# Patient Record
Sex: FEMALE | Race: WHITE | Employment: UNEMPLOYED | URBAN - METROPOLITAN AREA
[De-identification: names, ages, dates, MRNs, and addresses within clinical notes are randomized per-mention and may not be internally consistent; named-entity substitution may affect disease eponyms.]

---

## 2021-07-14 ENCOUNTER — TELEPHONE (OUTPATIENT)
Dept: SURGERY | Facility: CLINIC | Age: 56
End: 2021-07-14

## 2021-07-14 DIAGNOSIS — Z12.11 COLON CANCER SCREENING: Primary | ICD-10-CM

## 2021-07-27 LAB
B PARAP IS1001 DNA: NOT DETECTED
B PERT.PT PROM REG: NOT DETECTED
C PNEUM DNA: NOT DETECTED
CALCIUM BLD-MCNC: POSITIVE MG/DL
COVID-19 AB, IGM: POSITIVE
FLUAV RNA: NOT DETECTED
FLUBV RNA: NOT DETECTED
HADV DNA: NOT DETECTED
HCOV 229E RNA: NOT DETECTED
HCOV HKU1 RNA: NOT DETECTED
HCOV NL63 RNA: NOT DETECTED
HCOV OC43 RNA: NOT DETECTED
HMPV RNA: NOT DETECTED
HPIV1 RNA: NOT DETECTED
HPIV2 RNA: NOT DETECTED
HPIV3 RNA: NOT DETECTED
HPIV4 RNA: NOT DETECTED
M PNEUMO DNA: NOT DETECTED
RSV RNA: NOT DETECTED
RV+EV RNA: NOT DETECTED
SARS-COV-2 RNA RESP QL NAA+PROBE: NOT DETECTED

## 2021-07-30 ENCOUNTER — OUTSIDE PLACE OF SERVICE (OUTPATIENT)
Dept: SURGERY | Facility: CLINIC | Age: 56
End: 2021-07-30
Payer: MEDICAID

## 2021-07-30 PROCEDURE — G0121 COLON CA SCRN NOT HI RSK IND: ICD-10-PCS | Mod: ,,, | Performed by: SURGERY

## 2021-07-30 PROCEDURE — G0121 COLON CA SCRN NOT HI RSK IND: HCPCS | Mod: ,,, | Performed by: SURGERY

## 2023-08-08 DIAGNOSIS — R51.9 FREQUENT HEADACHES: Primary | ICD-10-CM

## 2023-12-07 ENCOUNTER — OFFICE VISIT (OUTPATIENT)
Dept: NEUROLOGY | Facility: CLINIC | Age: 58
End: 2023-12-07
Payer: MEDICAID

## 2023-12-07 ENCOUNTER — DOCUMENTATION ONLY (OUTPATIENT)
Dept: NEUROLOGY | Facility: CLINIC | Age: 58
End: 2023-12-07

## 2023-12-07 VITALS
RESPIRATION RATE: 18 BRPM | SYSTOLIC BLOOD PRESSURE: 107 MMHG | WEIGHT: 188.63 LBS | OXYGEN SATURATION: 99 % | BODY MASS INDEX: 33.42 KG/M2 | HEIGHT: 63 IN | TEMPERATURE: 98 F | HEART RATE: 57 BPM | DIASTOLIC BLOOD PRESSURE: 72 MMHG

## 2023-12-07 DIAGNOSIS — R56.9 SEIZURES: ICD-10-CM

## 2023-12-07 DIAGNOSIS — G43.109 OCULAR MIGRAINE: ICD-10-CM

## 2023-12-07 DIAGNOSIS — G43.E11 CHRONIC MIGRAINE WITH AURA, INTRACTABLE, WITH STATUS MIGRAINOSUS: Primary | ICD-10-CM

## 2023-12-07 DIAGNOSIS — R51.9 FREQUENT HEADACHES: ICD-10-CM

## 2023-12-07 PROCEDURE — 3008F BODY MASS INDEX DOCD: CPT | Mod: CPTII,,, | Performed by: NURSE PRACTITIONER

## 2023-12-07 PROCEDURE — 96372 THER/PROPH/DIAG INJ SC/IM: CPT | Mod: PBBFAC

## 2023-12-07 PROCEDURE — 3074F SYST BP LT 130 MM HG: CPT | Mod: CPTII,,, | Performed by: NURSE PRACTITIONER

## 2023-12-07 PROCEDURE — 3008F PR BODY MASS INDEX (BMI) DOCUMENTED: ICD-10-PCS | Mod: CPTII,,, | Performed by: NURSE PRACTITIONER

## 2023-12-07 PROCEDURE — 3078F PR MOST RECENT DIASTOLIC BLOOD PRESSURE < 80 MM HG: ICD-10-PCS | Mod: CPTII,,, | Performed by: NURSE PRACTITIONER

## 2023-12-07 PROCEDURE — 1160F RVW MEDS BY RX/DR IN RCRD: CPT | Mod: CPTII,,, | Performed by: NURSE PRACTITIONER

## 2023-12-07 PROCEDURE — 99215 OFFICE O/P EST HI 40 MIN: CPT | Mod: PBBFAC | Performed by: NURSE PRACTITIONER

## 2023-12-07 PROCEDURE — 1159F MED LIST DOCD IN RCRD: CPT | Mod: CPTII,,, | Performed by: NURSE PRACTITIONER

## 2023-12-07 PROCEDURE — 3078F DIAST BP <80 MM HG: CPT | Mod: CPTII,,, | Performed by: NURSE PRACTITIONER

## 2023-12-07 PROCEDURE — 99205 PR OFFICE/OUTPT VISIT, NEW, LEVL V, 60-74 MIN: ICD-10-PCS | Mod: FQ,S$PBB,, | Performed by: NURSE PRACTITIONER

## 2023-12-07 PROCEDURE — 1159F PR MEDICATION LIST DOCUMENTED IN MEDICAL RECORD: ICD-10-PCS | Mod: CPTII,,, | Performed by: NURSE PRACTITIONER

## 2023-12-07 PROCEDURE — 3074F PR MOST RECENT SYSTOLIC BLOOD PRESSURE < 130 MM HG: ICD-10-PCS | Mod: CPTII,,, | Performed by: NURSE PRACTITIONER

## 2023-12-07 PROCEDURE — 99205 OFFICE O/P NEW HI 60 MIN: CPT | Mod: FQ,S$PBB,, | Performed by: NURSE PRACTITIONER

## 2023-12-07 PROCEDURE — 1160F PR REVIEW ALL MEDS BY PRESCRIBER/CLIN PHARMACIST DOCUMENTED: ICD-10-PCS | Mod: CPTII,,, | Performed by: NURSE PRACTITIONER

## 2023-12-07 RX ORDER — TOPIRAMATE 50 MG/1
50 TABLET, FILM COATED ORAL 2 TIMES DAILY
COMMUNITY

## 2023-12-07 RX ORDER — IBUPROFEN 800 MG/1
800 TABLET ORAL 2 TIMES DAILY PRN
COMMUNITY

## 2023-12-07 RX ORDER — SUMATRIPTAN 5 MG/1
5 SPRAY NASAL
COMMUNITY
End: 2023-12-07

## 2023-12-07 RX ORDER — KETOROLAC TROMETHAMINE 30 MG/ML
30 INJECTION, SOLUTION INTRAMUSCULAR; INTRAVENOUS
Status: COMPLETED | OUTPATIENT
Start: 2023-12-07 | End: 2023-12-07

## 2023-12-07 RX ORDER — ALBUTEROL SULFATE 90 UG/1
1-2 AEROSOL, METERED RESPIRATORY (INHALATION)
COMMUNITY
Start: 2023-11-21

## 2023-12-07 RX ORDER — LORAZEPAM 1 MG/1
1 TABLET ORAL EVERY 8 HOURS PRN
COMMUNITY

## 2023-12-07 RX ORDER — PROMETHAZINE HYDROCHLORIDE 50 MG/1
50 TABLET ORAL EVERY 6 HOURS PRN
COMMUNITY

## 2023-12-07 RX ORDER — HYDROCHLOROTHIAZIDE 50 MG/1
50 TABLET ORAL DAILY
COMMUNITY

## 2023-12-07 RX ORDER — SEMAGLUTIDE 0.68 MG/ML
0.5 INJECTION, SOLUTION SUBCUTANEOUS
COMMUNITY
Start: 2023-11-29

## 2023-12-07 RX ORDER — OFLOXACIN 3 MG/ML
5 SOLUTION AURICULAR (OTIC)
COMMUNITY
Start: 2023-09-11

## 2023-12-07 RX ORDER — RIMEGEPANT SULFATE 75 MG/75MG
75 TABLET, ORALLY DISINTEGRATING ORAL ONCE AS NEEDED
COMMUNITY
End: 2023-12-07

## 2023-12-07 RX ORDER — FLUTICASONE PROPIONATE 50 MCG
2 SPRAY, SUSPENSION (ML) NASAL
COMMUNITY
Start: 2023-08-20

## 2023-12-07 RX ORDER — UBROGEPANT 100 MG/1
100 TABLET ORAL 2 TIMES DAILY PRN
Qty: 16 TABLET | Refills: 2 | Status: SHIPPED | OUTPATIENT
Start: 2023-12-07 | End: 2024-03-12 | Stop reason: SDUPTHER

## 2023-12-07 RX ORDER — LEVETIRACETAM 750 MG/1
750 TABLET, EXTENDED RELEASE ORAL DAILY
COMMUNITY

## 2023-12-07 RX ORDER — LINACLOTIDE 72 UG/1
72 CAPSULE, GELATIN COATED ORAL
COMMUNITY

## 2023-12-07 RX ORDER — METOPROLOL TARTRATE 50 MG/1
50 TABLET ORAL 2 TIMES DAILY
COMMUNITY

## 2023-12-07 RX ORDER — LEVOCETIRIZINE DIHYDROCHLORIDE 5 MG/1
5 TABLET ORAL NIGHTLY
COMMUNITY

## 2023-12-07 RX ORDER — ALBUTEROL SULFATE 0.83 MG/ML
2.5 SOLUTION RESPIRATORY (INHALATION)
COMMUNITY
Start: 2023-11-20

## 2023-12-07 RX ORDER — ESCITALOPRAM OXALATE 10 MG/1
10 TABLET ORAL EVERY MORNING
COMMUNITY
End: 2024-03-12

## 2023-12-07 RX ORDER — POLYETHYLENE GLYCOL 3350 17 G/17G
17 POWDER, FOR SOLUTION ORAL DAILY
COMMUNITY
Start: 2023-11-28 | End: 2024-03-12

## 2023-12-07 RX ORDER — DICLOFENAC POTASSIUM 50 MG/1
50 TABLET, FILM COATED ORAL 2 TIMES DAILY PRN
COMMUNITY
Start: 2023-11-16

## 2023-12-07 RX ORDER — RIZATRIPTAN BENZOATE 10 MG/1
10 TABLET ORAL ONCE AS NEEDED
COMMUNITY
Start: 2023-03-27 | End: 2023-12-07

## 2023-12-07 RX ADMIN — KETOROLAC TROMETHAMINE 30 MG: 30 INJECTION, SOLUTION INTRAMUSCULAR; INTRAVENOUS at 02:12

## 2023-12-07 NOTE — PROGRESS NOTES
Lorrie Troncoso NP ordered for patient to complete EEG. [employer] order form completed and faxed along with clinicals/facesheet. Fax confirmation successful.

## 2023-12-07 NOTE — PROGRESS NOTES
[facility] Neurology Initial Office Visit Note    Initial Visit Date: 12/7/2023  Last Visit Date:  Current Visit Date:  12/07/2023    Chief Complaint:     Chief Complaint   Patient presents with    Headache    Seizures     C/o headache today pain 4/10, stays with headaches 18-19 days out of the month, started at 5-6 years old, previous specialist was in [place]; h/o seizures, last one was a few months ago     History of Present Illness:      This is 57 y.o. female with history of COVID 6/2022, HTN, seizure, migraines, cholesteatoma, chronic ear infection, tympanomastoidectomy, HANNA on CPAP, R CWU mastoidectomy who is referred headache disorder. Currently c/o migraine accompanied by dizziness and OS ptosis.    Age of Onset : 5-7 YO    Headache Description: L frontal and temporal area, cannot describe type, accompanied by nausea/vomiting/photophobia/phonophobia, worsened w/activity, must lay in bed may last up to 3 days if does not take any medication, severe in nature, also accompanied by sparkles and zig zag to only one eye    Frequency: 18-19 headache days per month.     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  - History head trauma: Yes fell  - History of underlying mood disorder: Yes anxiety/depression  - History of sleep disorder: Yes HANNA on CPAP  - Recreational drug use: No  - Tobacco use: No  - Alcohol use: No  - Weight fluctuation: No  - Isotretinoin or Tetracycline use:  Unknown  - Family planning and contraceptive use: Not Applicable    # Seizure  Age of Onset : 5 yrs ago after fell and hit head    Semiology: R arms moves up and down, head tilts backward, body stiffens, a moan or utterance, lasts 2 minutes, jerking stops, sleeps afterward, lost bladder once    Frequency: 48 events/month prior to treatment, GTC frequency 1 events/4-6  months    Provocation Factors: stress    Risk Factors  - Family history of seizure disorders:  Yes niece  - History of focal CNS lesions: No  - History of CNS infections: Yes encephalitis  - History head trauma with prolonged LOC: yes after 1st seizures  - History of childhood seizures, including febrile seizures: No  - History of development delay: No   - History of underlying mood disorder: Yes depression/anxiety   - History of sleep disorder: Yes HANNA on CPAP  - Recreational drug use: No  - Alcohol use: No  - Family planning and contraceptive use: Not Applicable   Medications:     Current Prophylactic  TPM  50 mg PO BID - ineffective  Metoprolol 50mg daily - ineffective for migraine    Current Abortive  Nurtec ODT - partially effective  Sumatriptan nasal spray - somewhat effective    Prior Prophylactic  Aimovig - ineffective  Botox - 2021 - ineffective  Emgality - nausea  Depakote XR 500mg nightly (3-2022 - 10/2022) - ineffective    Prior Abortive  Rizatriptan 10mg O BID - ineffective      Medications:     Current Anti-Seizure Medication(s):  Keppra XR 1500mg daily    Prior Anti-Seizure Medication(s):  Depakote XR 500mg nighlty    Surgical Intervention & Devices:     - VNS:  - DBS  - RNS:  - Lobectomy:    Studies:    MRI IAC/Temporal Bones 12/19/2022 - persistent T2 hyperintensity in the R mastoidectomy cavity which does have central hypointensity  - MRI Brain: 10/11/2017 - minimal asymmetry in the size of the temporal horns of the lateral ventricles. Otherwise, no acute intracranial findings.  - MRA Head w/o Nathan:   - MRV Head w/o Nathan:   - NCHCT:  - Lumbar Puncture:    - routine EEG:   - one hour EEG:   - 24 hour EEG:  - Ambulatory EEG:  - EMU Video EEG:  - MRI Brain:   - NCHCT:  - WADA:    Review of Systems:     ROS  As per HPI. All other systems negative  Physical Exams:     Vitals:    12/07/23 1317   BP: 107/72   Pulse: (!) 57   Resp: 18   Temp: 97.5 °F (36.4 °C)       Physical Exam  HENT:      Head:  Normocephalic.      Right Ear: External ear normal.      Left Ear: External ear normal.      Nose: Nose normal.      Mouth/Throat:      Mouth: Mucous membranes are moist.   Eyes:      Pupils: Pupils are equal, round, and reactive to light.   Cardiovascular:      Rate and Rhythm: Bradycardia present.   Pulmonary:      Effort: Pulmonary effort is normal.   Abdominal:      General: There is no distension.      Tenderness: There is no guarding.      Comments: round   Musculoskeletal:         General: Normal range of motion.      Cervical back: Normal range of motion.   Skin:     General: Skin is warm and dry.   Neurological:      General: No focal deficit present.      Mental Status: She is alert.   Psychiatric:         Mood and Affect: Mood normal.     Comprehensive Neurological Exam:  Mental Status: Alert Oriented to Self, Date, and Place. Naming, repetition, reading, and writing wnl. Comprehension wnl. No dysarthria.   CN II - XII: BAY, No APD, VA grossly intact to finger counting at 6 ft, VFFC, OS ptosis, EOMI without nystagmus, LT/Temp symmetric in CN V1-3 distribution, Hearing grossly intact, Face Symmetric, Tongue and Uvula midline, Trapezius symmetric bilateral.   Motor: tone and bulk wnl throughout, no abnormal involuntary or voluntary movements, 5/5 to confrontation, Fine finger movements wnl b/l, No pronator drift.   Sensory: LT, Proprioception, Vibration, PP, Temp symmetric. No sensory simultagnosia.   Reflexes: 2+ throughout, plantar reflexes downward bilateral.   Cerebellar: FNF wnl b/l, RAHM wnl b/l, Finger chasing 5/5 b/l, HTS wnl.  Romberg: Negative  Modified Tandem Romberg: wnl  Gait: normal. Heel Gait, Toe Gait, Tandem Gait wnl.     Assessment:     This is 57 y.o. female with history of COVID 6/2022, HTN, seizure, migraines, cholesteatoma, chronic ear infection, R CWU mastoidectomy, who is referred headache disorder. HA are migraine w/aura, intractable and ocular in nature. Pt informed me that she  suffers with pseudoseizures. Has tried and failed multiple medications for migraine prevention and abortive therapy. Qulipta is to be used as prophylaxic medication, Ubrelvy is to be used as abortive therapy.    Problem List Items Addressed This Visit          Neuro    Ocular migraine       Other    Chronic migraine with aura, intractable, with status migrainosus - Primary    Relevant Medications    ubrogepant (UBRELVY) 100 mg tablet    atogepant 60 mg Tab     Other Visit Diagnoses       Frequent headaches        Seizures              Plan:     [] c/w phenergan 50mg PO BID PRN nausea  [] DC all triptans as they are contraindicated in ocular migraine  [] start Ubrelvy 100mg PO BID PRN at onset of migraine  [] Start Qulipta 60mg daily as migraine preventative has failed two injectables and two other preventative medications  [] call office for migraine >24 hrs and failed abortive therapy; will call in HA gustavo  [] home rEEG    RTC 3 mths    Headache education provided: good sleep hygiene and 7 hours of sleep per night, stress management, medication overuse education provided. Using more 3 OTC per week may worsen headaches, high intensity interval training has shown to reduce headache frequency. Low carb, high protein has shown to reduce headache frequency. Patient is instructed in keep headache diary.     I have explained the treatment plan, diagnosis, and prognosis to patient. All questions are answered to the best of my knowledge.     Face to face time 60 minutes, including documentation, chart review, counseling, education, review of test results, relevant medical records, and coordination of care.     Lorrie FLOWERS  General Neurology  12/07/2023

## 2023-12-11 ENCOUNTER — TELEPHONE (OUTPATIENT)
Dept: NEUROLOGY | Facility: CLINIC | Age: 58
End: 2023-12-11
Payer: MEDICAID

## 2023-12-11 NOTE — TELEPHONE ENCOUNTER
Notified Ms Andrew-both Ubrelvy and Atogepant (Qulipita) were approved. Ms Bennett verbalized understanding. Also notified Violetta with [facility] pharmacy.        ----- Message from Silvia Madison sent at 12/11/2023 10:40 AM CST -----  Regarding: Medication Management  Pt stated that Pharmacy is needing a PA on RX ATOGRPANT 60 mg and UBREVLY 100 mg. Pt can be reached at  [phone]    Thank You

## 2024-03-12 ENCOUNTER — OFFICE VISIT (OUTPATIENT)
Dept: NEUROLOGY | Facility: CLINIC | Age: 59
End: 2024-03-12
Payer: MEDICAID

## 2024-03-12 VITALS
BODY MASS INDEX: 32.43 KG/M2 | HEART RATE: 59 BPM | SYSTOLIC BLOOD PRESSURE: 120 MMHG | OXYGEN SATURATION: 98 % | WEIGHT: 183 LBS | HEIGHT: 63 IN | DIASTOLIC BLOOD PRESSURE: 84 MMHG

## 2024-03-12 DIAGNOSIS — G43.109 OCULAR MIGRAINE: Primary | ICD-10-CM

## 2024-03-12 DIAGNOSIS — G43.E11 CHRONIC MIGRAINE WITH AURA, INTRACTABLE, WITH STATUS MIGRAINOSUS: ICD-10-CM

## 2024-03-12 PROCEDURE — 1160F RVW MEDS BY RX/DR IN RCRD: CPT | Mod: CPTII,,, | Performed by: NURSE PRACTITIONER

## 2024-03-12 PROCEDURE — 3008F BODY MASS INDEX DOCD: CPT | Mod: CPTII,,, | Performed by: NURSE PRACTITIONER

## 2024-03-12 PROCEDURE — 99214 OFFICE O/P EST MOD 30 MIN: CPT | Mod: S$PBB,,, | Performed by: NURSE PRACTITIONER

## 2024-03-12 PROCEDURE — 99215 OFFICE O/P EST HI 40 MIN: CPT | Mod: PBBFAC | Performed by: NURSE PRACTITIONER

## 2024-03-12 PROCEDURE — 3074F SYST BP LT 130 MM HG: CPT | Mod: CPTII,,, | Performed by: NURSE PRACTITIONER

## 2024-03-12 PROCEDURE — 3079F DIAST BP 80-89 MM HG: CPT | Mod: CPTII,,, | Performed by: NURSE PRACTITIONER

## 2024-03-12 PROCEDURE — G2211 COMPLEX E/M VISIT ADD ON: HCPCS | Mod: S$PBB,,, | Performed by: NURSE PRACTITIONER

## 2024-03-12 PROCEDURE — 1159F MED LIST DOCD IN RCRD: CPT | Mod: CPTII,,, | Performed by: NURSE PRACTITIONER

## 2024-03-12 RX ORDER — LAMOTRIGINE 25 MG/1
25 TABLET ORAL 2 TIMES DAILY
Qty: 60 TABLET | Refills: 11 | Status: SHIPPED | OUTPATIENT
Start: 2024-03-12 | End: 2024-06-19 | Stop reason: SDUPTHER

## 2024-03-12 RX ORDER — SEMAGLUTIDE 1.34 MG/ML
1 INJECTION, SOLUTION SUBCUTANEOUS
COMMUNITY
Start: 2024-02-19 | End: 2024-06-19

## 2024-03-12 RX ORDER — PANTOPRAZOLE SODIUM 40 MG/1
40 TABLET, DELAYED RELEASE ORAL DAILY
COMMUNITY
Start: 2024-02-16 | End: 2024-06-19

## 2024-03-12 RX ORDER — UBROGEPANT 100 MG/1
100 TABLET ORAL 2 TIMES DAILY PRN
Qty: 16 TABLET | Refills: 2 | Status: SHIPPED | OUTPATIENT
Start: 2024-03-12 | End: 2024-06-19 | Stop reason: SDUPTHER

## 2024-03-12 NOTE — PROGRESS NOTES
[facility] Neurology Initial Office Visit Note    Initial Visit Date: 12/7/2023  Last Visit Date: 12/7/2023  Current Visit Date:  03/12/2024    Chief Complaint:     Chief Complaint   Patient presents with    Migraine     Patient reports migraines are the same.    Seizures     Patient's  reports that patient had a seizure approx. 2 weeks ago.     History of Present Illness:      This is 58 y.o. female with history of COVID 6/2022, HTN, seizure, migraines, cholesteatoma, chronic ear infection, tympanomastoidectomy, HANNA on CPAP, R CWU mastoidectomy who was referred headache disorder. Currently c/o migraine accompanied by dizziness and OS ptosis. During last visit, Qulipta 60mg daily and Ubrelvy 100mg PO BID were initiated. Triptan discontinued.    Today, Pt states she continues w/daily migraine. Does not find improvement on Qulipta. Ubrelvy effective as abortive therapy, but requires 2 doses and runs out before end of month. C/O pain to L temporal area radiating distally in front of tragus. Denies  jaw pain. Has tried and failed multiple medications in past.     Age of Onset : 5-7 YO    Headache Description: L frontal and temporal area, cannot describe type, accompanied by nausea/vomiting/photophobia/phonophobia, worsened w/activity, must lay in bed may last up to 3 days if does not take any medication, severe in nature, also accompanied by sparkles and zig zag to only one eye    Frequency: 18-19 headache days per month.     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  - History head trauma: Yes fell  - History of underlying mood disorder: Yes anxiety/depression  - History of sleep disorder: Yes HANNA on CPAP  - Recreational drug use: No  - Tobacco use: No  - Alcohol use: No  - Weight fluctuation: No  - Isotretinoin or Tetracycline use:  Unknown  -  Family planning and contraceptive use: Not Applicable    # Seizure  Age of Onset : 5 yrs ago after fell and hit head    Semiology: R arms moves up and down, head tilts backward, body stiffens, a moan or utterance, lasts 2 minutes, jerking stops, sleeps afterward, lost bladder once    Frequency: 48 events/month prior to treatment, GTC frequency 1 events/4-6 months    Provocation Factors: stress    Risk Factors  - Family history of seizure disorders:  Yes niece  - History of focal CNS lesions: No  - History of CNS infections: Yes encephalitis  - History head trauma with prolonged LOC: yes after 1st seizures  - History of childhood seizures, including febrile seizures: No  - History of development delay: No   - History of underlying mood disorder: Yes depression/anxiety   - History of sleep disorder: Yes HANNA on CPAP  - Recreational drug use: No  - Alcohol use: No  - Family planning and contraceptive use: Not Applicable   Medications:     Current Prophylactic  Qulipta 60mg PO Qday (12/7/2023 - present) ineffective  TPM  50 mg PO BID - ineffective  Metoprolol 50mg daily - ineffective for migraine    Current Abortive  Ubrelvy 100mg PO BID PRN (12/7/2023 - present) - effective    Prior Prophylactic  Aimovig - ineffective  Botox - 2021 - ineffective  Emgality - nausea  Depakote XR 500mg nightly (3-2022 - 10/2022) - ineffective    Prior Abortive  Rizatriptan 10mg O BID - ineffective  Nurtec ODT - partially effective  Sumatriptan nasal spray - somewhat effective  Medications:     Current Anti-Seizure Medication(s):  Keppra XR 1500mg daily    Prior Anti-Seizure Medication(s):  Depakote XR 500mg nighlty    Surgical Intervention & Devices:     - VNS:  - DBS  - RNS:  - Lobectomy:    Studies:    - MRI IAC/Temporal Bones 12/19/2022 - persistent T2 hyperintensity in the R mastoidectomy cavity which does have central hypointensity  - MRI Brain: 10/11/2017 - minimal asymmetry in the size of the temporal horns of the lateral  ventricles. Otherwise, no acute intracranial findings.  - MRA Head w/o Nathan:   - MRV Head w/o Nathan:   - NCHCT:  - Lumbar Puncture:    - routine EEG 12.21.2023 - no epileptiform activity  - one hour EEG:   - 24 hour EEG:  - Ambulatory EEG:  - EMU Video EEG:  - MRI Brain:   - NCHCT:  - WADA:    Review of Systems:     ROS  As per HPI. All other systems negative  Physical Exams:     Vitals:    03/12/24 1248   BP: 120/84   Pulse: (!) 59         Physical Exam  HENT:      Head: Normocephalic.      Comments: Pain to palpation to L temporal area, L upper jaw and L tragus, NO pain to TMJ     Right Ear: External ear normal.      Left Ear: External ear normal.      Nose: Nose normal.      Mouth/Throat:      Mouth: Mucous membranes are moist.   Eyes:      Pupils: Pupils are equal, round, and reactive to light.   Cardiovascular:      Rate and Rhythm: Bradycardia present.   Pulmonary:      Effort: Pulmonary effort is normal.   Abdominal:      General: There is no distension.      Tenderness: There is no guarding.      Comments: round   Musculoskeletal:         General: Normal range of motion.      Cervical back: Normal range of motion.   Skin:     General: Skin is warm and dry.   Neurological:      General: No focal deficit present.      Mental Status: She is alert.   Psychiatric:         Mood and Affect: Mood normal.     Comprehensive Neurological Exam:  Mental Status: Alert Oriented to Self, Date, and Place. Naming, repetition, reading, and writing wnl. Comprehension wnl. No dysarthria.   CN II - XII: BAY, No APD, VA grossly intact to finger counting at 6 ft, VFFC, OS ptosis, EOMI without nystagmus, LT/Temp symmetric in CN V1-3 distribution, Hearing grossly intact, Face Symmetric, Tongue and Uvula midline, Trapezius symmetric bilateral.   Motor: tone and bulk wnl throughout, no abnormal involuntary or voluntary movements, 5/5 to confrontation, Fine finger movements wnl b/l, No pronator drift.   Sensory: LT, Proprioception,  Vibration, PP, Temp symmetric. No sensory simultagnosia.   Reflexes: 2+ throughout, plantar reflexes downward bilateral.   Cerebellar: FNF wnl b/l, RAHM wnl b/l, Finger chasing 5/5 b/l, HTS wnl.  Romberg: Negative  Modified Tandem Romberg: wnl  Gait: normal, bilat arm swing intact    Assessment:     This is 58 y.o. female with history of COVID 6/2022, HTN, seizure, migraines, cholesteatoma, chronic ear infection, R CWU mastoidectomy, who is referred headache disorder. HA are migraine w/aura, intractable and ocular in nature. Pt informed me that she suffers with pseudoseizures. Has tried and failed multiple medications for migraine prevention and abortive therapy. Tried and failed Qulipta and two injectable medications. Ubrelvy effective as abortive therapy, but requires two doses and runs out early. Migraines are affecting quality of life as it interferes with day to day activity. C/O L temporal pain that radiates to L tragus. Denies jaw pain.    Problem List Items Addressed This Visit          Neuro    Ocular migraine - Primary       Other    Chronic migraine with aura, intractable, with status migrainosus    Relevant Medications    lamoTRIgine (LAMICTAL) 25 MG tablet    ubrogepant (UBRELVY) 100 mg tablet    Other Relevant Orders    Sedimentation rate    C-Reactive Protein       Plan:     [] c/w phenergan 50mg PO BID PRN nausea  [] DC all triptans as they are contraindicated in ocular migraine  [] c/w Ubrelvy 100mg PO BID PRN at onset of migraine  [] d/c Qulipta 60mg daily as migraine preventative has failed two injectables and two other preventative medications  [] start LTG 25mg PO Qday x 1 week then increase to BID  [] call office for migraine >24 hrs and failed abortive therapy; will call in HA cocktail  [] Nerivio device for migraine  [] ESR, CRP to r/o temporal arteritis     RTC 3 mths    Headache education provided: good sleep hygiene and 7 hours of sleep per night, stress management, medication overuse  education provided. Using more 3 OTC per week may worsen headaches, high intensity interval training has shown to reduce headache frequency. Low carb, high protein has shown to reduce headache frequency. Patient is instructed in keep headache diary.     I have explained the treatment plan, diagnosis, and prognosis to patient. All questions are answered to the best of my knowledge.     Face to face time 30 minutes, including documentation, chart review, counseling, education, review of test results, relevant medical records, and coordination of care.     Lorrie Troncoso NP-C  General Neurology  03/12/2024

## 2024-03-13 ENCOUNTER — PATIENT MESSAGE (OUTPATIENT)
Dept: NEUROLOGY | Facility: CLINIC | Age: 59
End: 2024-03-13
Payer: MEDICAID

## 2024-04-11 ENCOUNTER — PATIENT MESSAGE (OUTPATIENT)
Dept: NEUROLOGY | Facility: CLINIC | Age: 59
End: 2024-04-11
Payer: MEDICAID

## 2024-06-19 ENCOUNTER — OFFICE VISIT (OUTPATIENT)
Dept: NEUROLOGY | Facility: CLINIC | Age: 59
End: 2024-06-19
Payer: MEDICAID

## 2024-06-19 VITALS
DIASTOLIC BLOOD PRESSURE: 73 MMHG | HEIGHT: 63 IN | BODY MASS INDEX: 32.43 KG/M2 | WEIGHT: 183 LBS | SYSTOLIC BLOOD PRESSURE: 104 MMHG | OXYGEN SATURATION: 97 % | HEART RATE: 65 BPM

## 2024-06-19 DIAGNOSIS — G43.109 OCULAR MIGRAINE: Primary | ICD-10-CM

## 2024-06-19 DIAGNOSIS — E66.1 CLASS 1 DRUG-INDUCED OBESITY WITHOUT SERIOUS COMORBIDITY WITH BODY MASS INDEX (BMI) OF 32.0 TO 32.9 IN ADULT: ICD-10-CM

## 2024-06-19 DIAGNOSIS — G43.E11 CHRONIC MIGRAINE WITH AURA, INTRACTABLE, WITH STATUS MIGRAINOSUS: ICD-10-CM

## 2024-06-19 PROBLEM — E66.811 CLASS 1 DRUG-INDUCED OBESITY WITHOUT SERIOUS COMORBIDITY WITH BODY MASS INDEX (BMI) OF 32.0 TO 32.9 IN ADULT: Status: ACTIVE | Noted: 2024-06-19

## 2024-06-19 PROCEDURE — 3008F BODY MASS INDEX DOCD: CPT | Mod: CPTII,,, | Performed by: NURSE PRACTITIONER

## 2024-06-19 PROCEDURE — 3078F DIAST BP <80 MM HG: CPT | Mod: CPTII,,, | Performed by: NURSE PRACTITIONER

## 2024-06-19 PROCEDURE — 1159F MED LIST DOCD IN RCRD: CPT | Mod: CPTII,,, | Performed by: NURSE PRACTITIONER

## 2024-06-19 PROCEDURE — 1160F RVW MEDS BY RX/DR IN RCRD: CPT | Mod: CPTII,,, | Performed by: NURSE PRACTITIONER

## 2024-06-19 PROCEDURE — 99214 OFFICE O/P EST MOD 30 MIN: CPT | Mod: PBBFAC | Performed by: NURSE PRACTITIONER

## 2024-06-19 PROCEDURE — 3074F SYST BP LT 130 MM HG: CPT | Mod: CPTII,,, | Performed by: NURSE PRACTITIONER

## 2024-06-19 PROCEDURE — 99214 OFFICE O/P EST MOD 30 MIN: CPT | Mod: S$PBB,,, | Performed by: NURSE PRACTITIONER

## 2024-06-19 RX ORDER — HYDROXYZINE PAMOATE 50 MG/1
50 CAPSULE ORAL
COMMUNITY
Start: 2024-05-29

## 2024-06-19 RX ORDER — UBROGEPANT 100 MG/1
100 TABLET ORAL 2 TIMES DAILY PRN
Qty: 16 TABLET | Refills: 2 | Status: SHIPPED | OUTPATIENT
Start: 2024-06-19

## 2024-06-19 RX ORDER — LEVETIRACETAM 750 MG/1
1500 TABLET, EXTENDED RELEASE ORAL DAILY
Qty: 120 TABLET | Refills: 11
Start: 2024-06-19

## 2024-06-19 RX ORDER — TIRZEPATIDE 2.5 MG/.5ML
2.5 INJECTION, SOLUTION SUBCUTANEOUS
COMMUNITY
Start: 2024-06-02

## 2024-06-19 RX ORDER — CETIRIZINE HYDROCHLORIDE 10 MG/1
10 TABLET ORAL EVERY MORNING
COMMUNITY
Start: 2024-06-13

## 2024-06-19 RX ORDER — LAMOTRIGINE 25 MG/1
25 TABLET ORAL 2 TIMES DAILY
Qty: 60 TABLET | Refills: 11 | Status: SHIPPED | OUTPATIENT
Start: 2024-06-19 | End: 2025-06-19

## 2024-06-19 RX ORDER — PREDNISONE 20 MG/1
20 TABLET ORAL 2 TIMES DAILY
COMMUNITY
Start: 2024-06-12

## 2024-06-19 NOTE — PROGRESS NOTES
"[facility] Neurology Follow Up Office Visit Note    Initial Visit Date: 12/7/2023  Last Visit Date: 3/12/2024  Current Visit Date:  06/19/2024    Chief Complaint:     Chief Complaint   Patient presents with    Migraine     Patient reports improvement since last visit.     History of Present Illness:      This is 58 y.o. female with history of COVID 6/2022, HTN, seizure, migraines, cholesteatoma, chronic ear infection, tympanomastoidectomy, HANNA on CPAP, R CWU mastoidectomy who was referred headache disorder. Currently c/o migraine accompanied by dizziness and OS ptosis. During last visit, Qulipta 60mg was discontinued, Ubrelvy 100mg PO BID was continued and LTG 25mg PO BID was inidiated. ESR/CRP were ordered to r/o temporal arteritis, pending    Today, Pt states LTG has been effective for migraine. Averages 1 migraine weekly. Ubrelvy effective as abortive therapy. Has weaned off Keppra as "I don't like taking drugs" denies witnessed seizure activity. Would like to keep on EMR in event she needs to restart. Intermittent pain to L temporal area radiating distally in front of tragus. Denies jaw pain. No complaint today.    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  - History head trauma: Yes fell  - History of underlying mood disorder: Yes anxiety/depression  - History of sleep disorder: Yes HANNA on CPAP  - Recreational drug use: No  - Tobacco use: No  - Alcohol use: No  - Weight " fluctuation: No  - Isotretinoin or Tetracycline use:  Unknown  - Family planning and contraceptive use: Not Applicable    # Seizure  Age of Onset : 5 yrs ago after fell and hit head    Semiology: R arms moves up and down, head tilts backward, body stiffens, a moan or utterance, lasts 2 minutes, jerking stops, sleeps afterward, lost bladder once    Frequency: 48 events/month prior to treatment, GTC frequency 1 events/4-6 months    Provocation Factors: stress    Risk Factors  - Family history of seizure disorders:  Yes niece  - History of focal CNS lesions: No  - History of CNS infections: Yes encephalitis  - History head trauma with prolonged LOC: yes after 1st seizures  - History of childhood seizures, including febrile seizures: No  - History of development delay: No   - History of underlying mood disorder: Yes depression/anxiety   - History of sleep disorder: Yes HANNA on CPAP  - Recreational drug use: No  - Alcohol use: No  - Family planning and contraceptive use: Not Applicable   Medications:     Current Prophylactic  LTG 25mg Po BID (3/12/2024 - present) - effective   TPM  50 mg PO BID - ineffective for migraine    Current Abortive  Ubrelvy 100mg PO BID PRN (12/7/2023 - present) - effective    Prior Prophylactic  Aimovig - ineffective  Botox - 2021 - ineffective  Emgality - nausea  Depakote XR 500mg nightly (3-2022 - 10/2022) - ineffective  Qulipta 60mg PO Qday (12/7/2023 - 3/12/2024) ineffective  Metoprolol 50mg daily - ineffective for migraine    Prior Abortive  Rizatriptan 10mg O BID - ineffective  Nurtec ODT - partially effective  Sumatriptan nasal spray - somewhat effective  Medications:     Current Anti-Seizure Medication(s):      Prior Anti-Seizure Medication(s):  Depakote XR 500mg nighlty  Keppra XR 1500mg daily - weaned off 4/2024    Surgical Intervention & Devices:     - VNS:  - DBS  - RNS:  - Lobectomy:    Studies:    - MRI IAC/Temporal Bones 12/19/2022 - persistent T2 hyperintensity in the R  mastoidectomy cavity which does have central hypointensity  - MRI Brain: 10/11/2017 - minimal asymmetry in the size of the temporal horns of the lateral ventricles. Otherwise, no acute intracranial findings.  - MRA Head w/o Nathan:   - MRV Head w/o Nathan:   - NCHCT:  - Lumbar Puncture:    - routine EEG 12.21.2023 - no epileptiform activity  - one hour EEG:   - 24 hour EEG:  - Ambulatory EEG:  - EMU Video EEG:  - MRI Brain:   - NCHCT:  - WADA:    Review of Systems:     ROS  As per HPI. All other systems negative  Physical Exams:     Vitals:    06/19/24 1012   BP: 104/73   Pulse: 65       Physical Exam  HENT:      Head: Normocephalic.      Right Ear: External ear normal.      Left Ear: External ear normal.      Nose: Nose normal.      Mouth/Throat:      Mouth: Mucous membranes are moist.      Pharynx: Oropharynx is clear.   Eyes:      Pupils: Pupils are equal, round, and reactive to light.   Cardiovascular:      Rate and Rhythm: Normal rate and regular rhythm.   Pulmonary:      Effort: Pulmonary effort is normal.   Abdominal:      General: There is no distension.      Tenderness: There is no guarding.      Comments: round   Musculoskeletal:         General: Normal range of motion.      Cervical back: Normal range of motion.   Skin:     General: Skin is warm and dry.   Neurological:      General: No focal deficit present.      Mental Status: She is alert.   Psychiatric:         Mood and Affect: Mood normal.     Comprehensive Neurological Exam:  Mental Status: Alert Oriented to Self, Date, and Place. Naming, repetition, reading, and writing wnl. Comprehension wnl. No dysarthria.   CN II - XII: BAY, No APD, VA grossly intact to finger counting at 6 ft, VFFC, OS ptosis, EOMI without nystagmus, LT/Temp symmetric in CN V1-3 distribution, Hearing grossly intact, Face Symmetric, Tongue and Uvula midline, Trapezius symmetric bilateral.   Motor: tone and bulk wnl throughout, no abnormal involuntary or voluntary movements, 5/5 to  confrontation, Fine finger movements wnl b/l, No pronator drift.   Sensory: LT, Proprioception, Vibration, PP, Temp symmetric. No sensory simultagnosia.   Reflexes: 2+ throughout, plantar reflexes downward bilateral.   Cerebellar: FNF wnl b/l, RAHM wnl b/l, Finger chasing 5/5 b/l, HTS wnl.  Romberg: Negative  Gait: normal, bilat arm swing intact    Assessment:     This is 58 y.o. female with history of COVID 6/2022, HTN, pseudoseizure, migraines, cholesteatoma, chronic ear infection, R CWU mastoidectomy, who was referred headache disorder. HA are migraine w/aura, intractable and ocular in nature. Denies any witnessed seizure after weaning herself off Keppra since last visit. Tried and failed Qulipta and two injectable medications. LTG effective for migraine. Averages 1/week. Ubrelvy effective as abortive therapy. Intermittent temporal pain, none today.    Problem List Items Addressed This Visit          Neuro    Chronic migraine with aura, intractable, with status migrainosus    Relevant Medications    lamoTRIgine (LAMICTAL) 25 MG tablet    ubrogepant (UBRELVY) 100 mg tablet    Ocular migraine - Primary       Endocrine    Class 1 drug-induced obesity without serious comorbidity with body mass index (BMI) of 32.0 to 32.9 in adult     Plan:     [] c/w phenergan 50mg PO BID PRN nausea  [] DC all triptans as they are contraindicated in ocular migraine  [] c/w Ubrelvy 100mg PO BID PRN at onset of migraine  [] c/w LTG 25mg PO Qday x 1 week then increase to BID  [] call office for migraine >24 hrs and failed abortive therapy; will call in HA cocktail  [] hold ESR, CRP to r/o temporal arteritis     RTC 6 mths - TM okay    Headache education provided: good sleep hygiene and 7 hours of sleep per night, stress management, medication overuse education provided. Using more 3 OTC per week may worsen headaches, high intensity interval training has shown to reduce headache frequency. Low carb, high protein has shown to reduce  headache frequency. Patient is instructed in keep headache diary.     I have explained the treatment plan, diagnosis, and prognosis to patient. All questions are answered to the best of my knowledge.     Face to face time 30 minutes, including documentation, chart review, counseling, education, review of test results, relevant medical records, and coordination of care.     Lorrie Troncoso NP-C  General Neurology  06/19/2024

## 2024-09-16 ENCOUNTER — TELEPHONE (OUTPATIENT)
Dept: NEUROLOGY | Facility: CLINIC | Age: 59
End: 2024-09-16
Payer: MEDICAID

## 2024-09-16 RX ORDER — KETOROLAC TROMETHAMINE 10 MG/1
10 TABLET, FILM COATED ORAL EVERY 6 HOURS
Qty: 20 TABLET | Refills: 0 | Status: SHIPPED | OUTPATIENT
Start: 2024-09-16 | End: 2024-09-21

## 2024-09-16 RX ORDER — DIVALPROEX SODIUM 250 MG/1
250 TABLET, FILM COATED, EXTENDED RELEASE ORAL EVERY 12 HOURS
Qty: 10 TABLET | Refills: 0 | Status: SHIPPED | OUTPATIENT
Start: 2024-09-16 | End: 2024-09-21

## 2024-09-16 NOTE — TELEPHONE ENCOUNTER
I called in dexamethasone 4mg PO BID x 5 days and divalproex twice daily x 5 days as a headache cocktail. Pt is to take Ubrelvy at onset of migraine.

## 2024-09-16 NOTE — TELEPHONE ENCOUNTER
Patient states that she has been having migraines nonstop for the past 30 days. However, she states that she tried something different dis morning and she got a little relief. Patient states she took UBERVELY immediately when she woke up at 5 am. She wants to know what should she do going forward.

## 2024-10-02 DIAGNOSIS — G43.E11 CHRONIC MIGRAINE WITH AURA, INTRACTABLE, WITH STATUS MIGRAINOSUS: Primary | ICD-10-CM

## 2024-10-02 RX ORDER — DIVALPROEX SODIUM 250 MG/1
TABLET, FILM COATED, EXTENDED RELEASE ORAL
Qty: 10 TABLET | Refills: 0 | Status: SHIPPED | OUTPATIENT
Start: 2024-10-02

## 2024-10-22 ENCOUNTER — TELEPHONE (OUTPATIENT)
Dept: NEUROLOGY | Facility: CLINIC | Age: 59
End: 2024-10-22
Payer: MEDICAID

## 2024-10-22 ENCOUNTER — OFFICE VISIT (OUTPATIENT)
Dept: NEUROLOGY | Facility: CLINIC | Age: 59
End: 2024-10-22
Payer: MEDICAID

## 2024-10-22 DIAGNOSIS — G43.109 OCULAR MIGRAINE: ICD-10-CM

## 2024-10-22 DIAGNOSIS — G43.E11 CHRONIC MIGRAINE WITH AURA, INTRACTABLE, WITH STATUS MIGRAINOSUS: Primary | ICD-10-CM

## 2024-10-22 RX ORDER — METOPROLOL TARTRATE 50 MG/1
50 TABLET ORAL DAILY
COMMUNITY
Start: 2024-10-12

## 2024-10-22 RX ORDER — SCOLOPAMINE TRANSDERMAL SYSTEM 1 MG/1
1 PATCH, EXTENDED RELEASE TRANSDERMAL
COMMUNITY
Start: 2024-10-02

## 2024-10-22 RX ORDER — LAMOTRIGINE 25 MG/1
50 TABLET ORAL 2 TIMES DAILY
Qty: 120 TABLET | Refills: 11 | Status: SHIPPED | OUTPATIENT
Start: 2024-10-22 | End: 2025-10-22

## 2024-10-22 RX ORDER — TIRZEPATIDE 5 MG/.5ML
5 INJECTION, SOLUTION SUBCUTANEOUS
COMMUNITY
Start: 2024-08-02

## 2024-10-22 RX ORDER — UBROGEPANT 100 MG/1
100 TABLET ORAL 2 TIMES DAILY PRN
Qty: 16 TABLET | Refills: 2 | Status: SHIPPED | OUTPATIENT
Start: 2024-10-22

## 2024-10-22 RX ORDER — DIAZEPAM 2 MG/1
2 TABLET ORAL 2 TIMES DAILY
Qty: 10 TABLET | Refills: 0 | Status: SHIPPED | OUTPATIENT
Start: 2024-10-22 | End: 2024-10-27

## 2024-10-22 RX ORDER — DEXAMETHASONE 4 MG/1
4 TABLET ORAL EVERY 12 HOURS
Qty: 10 TABLET | Refills: 0 | Status: SHIPPED | OUTPATIENT
Start: 2024-10-22 | End: 2024-10-27

## 2024-10-22 RX ORDER — PANTOPRAZOLE SODIUM 40 MG/1
40 TABLET, DELAYED RELEASE ORAL
COMMUNITY
Start: 2024-08-15

## 2024-10-22 NOTE — TELEPHONE ENCOUNTER
[facility] in [place] does not have MRI. Spoke with patient, she is ok with having MRI done at [facility] in [place]. Please order MRI to be completed there.

## 2024-10-22 NOTE — PROGRESS NOTES
"[facility] Neurology Follow Up Office Visit Note    Initial Visit Date: 12/7/2023  Last Visit Date: 3/12/2024  Current Visit Date:  10/22/2024    Chief Complaint:     Chief Complaint   Patient presents with    Migraine     Patient reports increase in migraines since last visit. Nearly daily since August. No relief despite ER visit, abortive medication, and migraine cocktail. Trying to r/o eyes, ears, and teeth issues. Currently seeing chiropractor.     History of Present Illness:      This is 58 y.o. female with history of COVID 6/2022, HTN, seizure, migraines, cholesteatoma, chronic ear infection, tympanomastoidectomy, HANNA on CPAP, R CWU mastoidectomy who was referred headache disorder. Currently c/o migraine accompanied by dizziness and OS ptosis. During last visit, Qulipta 60mg was discontinued, Ubrelvy 100mg PO BID was continued and LTG 25mg PO BID was inidiated. ESR/CRP were ordered to r/o temporal arteritis, pending    Today, Pt states she has been having daily since August. Is taking Ubrelvy daily. Was seen in ED in [facility] on 10/11/2024 and given phenergan and Dilauded which she states only "dulled" the pain. Denies any other symptoms w/migraine. Called office twice and Rx for Depakote x 5 days and ketorolac were sent. Pt states she received minimal relief. Spouse requesting Vyepti infusion. Denies exacerbating factors. Has been unable to drive. Continues to take LTG 25mg PO BID. States she has been to her ENT, PCP with no relief. Utilizes CPAP nightly. States she also had a seizure 2 weeks ago she attributes to severe migraine. PCP started her back on Keppra 750mg PO Qday. No seizures since then.    Previous visit:  Admits to 2-3 seizures in last month due to severe HA. Has weaned off Keppra as "I don't like taking drugs" denies witnessed seizure activity. Would like to keep on EMR in event she needs to restart. Intermittent pain to L temporal area radiating distally in front of tragus. Denies jaw pain. No " complaint today.    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  - History head trauma: Yes fell  - History of underlying mood disorder: Yes anxiety/depression  - History of sleep disorder: Yes HANNA on CPAP  - Recreational drug use: No  - Tobacco use: No  - Alcohol use: No  - Weight fluctuation: No  - Isotretinoin or Tetracycline use:  Unknown  - Family planning and contraceptive use: Not Applicable    # Seizure  Age of Onset : 5 yrs ago after fell and hit head    Semiology: R arms moves up and down, head tilts backward, body stiffens, a moan or utterance, lasts 2 minutes, jerking stops, sleeps afterward, lost bladder once    Frequency: 48 events/month prior to treatment, GTC frequency 1 events/4-6 months    Provocation Factors: stress    Risk Factors  - Family history of seizure disorders:  Yes niece  - History of focal CNS lesions: No  - History of CNS infections: Yes encephalitis  - History head trauma with prolonged LOC: yes after 1st seizures  - History of childhood seizures, including febrile seizures: No  - History of development delay: No   - History of underlying mood disorder: Yes depression/anxiety   - History of sleep disorder: Yes HANNA on CPAP  - Recreational drug use: No  - Alcohol use: No  - Family planning and contraceptive use: Not Applicable   Medications:     Current Prophylactic  LTG 25mg PO BID (3/12/2024 - present) - ineffective at this time   TPM 50 mg PO  BID - ineffective for migraine  Keppra 750mg PO Qday    Current Abortive  Ubrelvy 100mg PO BID PRN (12/7/2023 - present) - ineffective at this time    Prior Prophylactic  Aimovig - ineffective  Botox - 2021 - ineffective  Emgality - nausea  Depakote XR 500mg nightly (3-2022 - 10/2022) - ineffective  Qulipta 60mg PO Qday (12/7/2023 - 3/12/2024) ineffective  Metoprolol 50mg daily - ineffective for migraine    Prior Abortive  Rizatriptan 10mg O BID - ineffective  Nurtec ODT - partially effective  Sumatriptan nasal spray - somewhat effective  Ondansetron PRN - ineffective  Medications:     Current Anti-Seizure Medication(s):  Keppra 750gm PO Qday (10/2024 - present) effective    Prior Anti-Seizure Medication(s):  Depakote XR 500mg nighlty  Keppra XR 1500mg daily - weaned off 4/2024    Surgical Intervention & Devices:     - VNS:  - DBS  - RNS:  - Lobectomy:    Studies:    - MRI IAC/Temporal Bones 12/19/2022 - persistent T2 hyperintensity in the R mastoidectomy cavity which does have central hypointensity  - MRI Brain: 10/11/2017 - minimal asymmetry in the size of the temporal horns of the lateral ventricles. Otherwise, no acute intracranial findings.  - MRA Head w/o Nathan:   - MRV Head w/o Nathan:   - NCHCT:  - Lumbar Puncture:    - routine EEG 12.21.2023 - no epileptiform activity  - one hour EEG:   - 24 hour EEG:  - Ambulatory EEG:  - EMU Video EEG:  - MRI Brain:   - NCHCT:  - WADA:    Review of Systems:     ROS  As per HPI. All other systems negative  Physical Exams:     There were no vitals filed for this visit.    Physical Exam  HENT:      Head: Normocephalic.      Right Ear: External ear normal.      Left Ear: External ear normal.      Nose: Nose normal.      Mouth/Throat:      Mouth: Mucous membranes are moist.      Pharynx: Oropharynx is clear.   Eyes:      Conjunctiva/sclera: Conjunctivae normal.   Cardiovascular:      Rate and Rhythm: Normal rate and regular rhythm.   Pulmonary:      Effort: Pulmonary effort is  normal.   Abdominal:      General: There is no distension.      Tenderness: There is no guarding.      Comments: round   Musculoskeletal:         General: Normal range of motion.      Cervical back: Normal range of motion.   Skin:     Coloration: Skin is not jaundiced.      Findings: No lesion or rash.   Neurological:      General: No focal deficit present.      Mental Status: She is alert.   Psychiatric:         Mood and Affect: Mood normal.     Comprehensive Neurological Exam:  Mental Status: Alert Oriented to Self, Date, and Place. Naming, repetition, and reading wnl. Comprehension wnl. No dysarthria.   CN II - XII: OS ptosis, EOMI without nystagmus, Hearing grossly intact, Face Symmetric, Tongue and Uvula midline, Trapezius symmetric bilateral.   Motor: tone and bulk wnl throughout, no abnormal involuntary or voluntary movements, fine finger movements wnl b/l, No pronator drift.   Sensory: ISATU due to nature of visit   Reflexes: ISATU due to nature of visit   Cerebellar: FNF wnl b/l  Gait: normal, bilat arm swing intact    Assessment:     This is 58 y.o. female with history of COVID 6/2022, HTN, pseudoseizure, migraines, cholesteatoma, chronic ear infection, R CWU mastoidectomy, who was referred headache disorder. HA are migraine w/aura, intractable and ocular in nature. Pt admits to daily migraine since August with no relief on LTG 25mg PO BID and Ubrelvy as abortive. Denies any exacerbating factors. Has been to ED where she was given phenergan and dilauded with little relief. Had taken Depakote 250mg PO BID x 5 days twice since last visit with no improvement in migraine. Has taken Toradol 10 mg PO Q6 hrs x 5 days with no improvement. Admits to seizure activity two weeks ago, believes related to severe migraine. PCP restarted Keppra 750mg PO daily. Denies seizures since then. Requesting Vyepti infusion. Tried and failed Qulipta and two injectable medications. LTG 25mg PO BID ineffective for migraine at this time.      Problem List Items Addressed This Visit          Neuro    Chronic migraine with aura, intractable, with status migrainosus - Primary    Relevant Medications    dexAMETHasone (DECADRON) 4 MG Tab    rimegepant 75 mg odt    ubrogepant (UBRELVY) 100 mg tablet    diazePAM (VALIUM) 2 MG tablet    lamoTRIgine (LAMICTAL) 25 MG tablet    Other Relevant Orders    MRI Brain W WO Contrast    Ocular migraine       Plan:   [] Vyepti infusion 100mg every 3 mths at [facility]  [] Rx dexamethasone 4mg PO BID x 5 days as abortive therapy  [] Rx Nurtec 75mg ODT QOD as preventative  [] Rx for valium 2mg PO BID x 5 days as abortive therapy  [] c/w phenergan 50mg PO BID PRN nausea  [] c/w Ubrelvy 100mg PO BID PRN at onset of migraine as abortive therapy  [] increase LTG to 25mg in AM and 50mg nightly x 1 week then increased to 50mg PO BID thereafter.   [] order MRI brain +/- Nathan at [facility] in [place] for worsening migraine  [] call office for migraine >24 hrs and failed abortive therapy; will call in HA cocktail  [] hold ESR, CRP to r/o temporal arteritis     RTC 12/23/2024 @ 10 AM - TM okay    Headache education provided: good sleep hygiene and 7 hours of sleep per night, stress management, medication overuse education provided. Using more 3 OTC per week may worsen headaches, high intensity interval training has shown to reduce headache frequency. Low carb, high protein has shown to reduce headache frequency. Patient is instructed in keep headache diary.     I have explained the treatment plan, diagnosis, and prognosis to patient. All questions are answered to the best of my knowledge.     Face to face time 30 minutes, including documentation, chart review, counseling, education, review of test results, relevant medical records, and coordination of care.     Lorrie Troncoso NP-C  General Neurology  10/22/2024

## 2024-10-23 ENCOUNTER — TELEPHONE (OUTPATIENT)
Dept: NEUROLOGY | Facility: CLINIC | Age: 59
End: 2024-10-23
Payer: MEDICAID

## 2024-10-23 NOTE — TELEPHONE ENCOUNTER
----- Message from Elvie sent at 10/23/2024  1:28 PM CDT -----  [facility] is calling in regards to this pt. Medication was sent and they need to clarify some information on it.     Callback [phone]

## 2024-10-28 ENCOUNTER — HOSPITAL ENCOUNTER (EMERGENCY)
Facility: HOSPITAL | Age: 59
Discharge: HOME | End: 2024-10-28
Attending: STUDENT IN AN ORGANIZED HEALTH CARE EDUCATION/TRAINING PROGRAM
Payer: MEDICAID

## 2024-10-28 ENCOUNTER — TELEPHONE (OUTPATIENT)
Dept: NEUROLOGY | Facility: CLINIC | Age: 59
End: 2024-10-28
Payer: MEDICAID

## 2024-10-28 VITALS
OXYGEN SATURATION: 95 % | HEIGHT: 62 IN | HEART RATE: 72 BPM | DIASTOLIC BLOOD PRESSURE: 76 MMHG | SYSTOLIC BLOOD PRESSURE: 112 MMHG | TEMPERATURE: 98 F | RESPIRATION RATE: 18 BRPM | WEIGHT: 193.56 LBS | BODY MASS INDEX: 35.62 KG/M2

## 2024-10-28 DIAGNOSIS — G89.29 CHRONIC INTRACTABLE HEADACHE, UNSPECIFIED HEADACHE TYPE: Primary | ICD-10-CM

## 2024-10-28 DIAGNOSIS — R51.9 CHRONIC INTRACTABLE HEADACHE, UNSPECIFIED HEADACHE TYPE: Primary | ICD-10-CM

## 2024-10-28 LAB
ALBUMIN SERPL-MCNC: 3.7 G/DL (ref 3.5–5)
ALBUMIN/GLOB SERPL: 1 RATIO (ref 1.1–2)
ALP SERPL-CCNC: 79 UNIT/L (ref 40–150)
ALT SERPL-CCNC: 8 UNIT/L (ref 0–55)
ANION GAP SERPL CALC-SCNC: 11 MEQ/L
AST SERPL-CCNC: 11 UNIT/L (ref 5–34)
BASOPHILS # BLD AUTO: 0.13 X10(3)/MCL
BASOPHILS NFR BLD AUTO: 1 %
BILIRUB SERPL-MCNC: 0.3 MG/DL
BUN SERPL-MCNC: 21.5 MG/DL (ref 9.8–20.1)
CALCIUM SERPL-MCNC: 9.6 MG/DL (ref 8.4–10.2)
CHLORIDE SERPL-SCNC: 97 MMOL/L (ref 98–107)
CO2 SERPL-SCNC: 30 MMOL/L (ref 22–29)
CREAT SERPL-MCNC: 1.35 MG/DL (ref 0.55–1.02)
CREAT/UREA NIT SERPL: 16
EOSINOPHIL # BLD AUTO: 0.83 X10(3)/MCL (ref 0–0.9)
EOSINOPHIL NFR BLD AUTO: 6.3 %
ERYTHROCYTE [DISTWIDTH] IN BLOOD BY AUTOMATED COUNT: 14.2 % (ref 11.5–17)
GFR SERPLBLD CREATININE-BSD FMLA CKD-EPI: 45 ML/MIN/1.73/M2
GLOBULIN SER-MCNC: 3.8 GM/DL (ref 2.4–3.5)
GLUCOSE SERPL-MCNC: 113 MG/DL (ref 74–100)
HCT VFR BLD AUTO: 40.9 % (ref 37–47)
HGB BLD-MCNC: 14.1 G/DL (ref 12–16)
HOLD SPECIMEN: NORMAL
IMM GRANULOCYTES # BLD AUTO: 0.09 X10(3)/MCL (ref 0–0.04)
IMM GRANULOCYTES NFR BLD AUTO: 0.7 %
LYMPHOCYTES # BLD AUTO: 3.41 X10(3)/MCL (ref 0.6–4.6)
LYMPHOCYTES NFR BLD AUTO: 25.8 %
MAGNESIUM SERPL-MCNC: 2.2 MG/DL (ref 1.6–2.6)
MCH RBC QN AUTO: 27.5 PG (ref 27–31)
MCHC RBC AUTO-ENTMCNC: 34.5 G/DL (ref 33–36)
MCV RBC AUTO: 79.7 FL (ref 80–94)
MONOCYTES # BLD AUTO: 1.07 X10(3)/MCL (ref 0.1–1.3)
MONOCYTES NFR BLD AUTO: 8.1 %
NEUTROPHILS # BLD AUTO: 7.71 X10(3)/MCL (ref 2.1–9.2)
NEUTROPHILS NFR BLD AUTO: 58.1 %
NRBC BLD AUTO-RTO: 0 %
PLATELET # BLD AUTO: 324 X10(3)/MCL (ref 130–400)
PMV BLD AUTO: 10.7 FL (ref 7.4–10.4)
POTASSIUM SERPL-SCNC: 3.3 MMOL/L (ref 3.5–5.1)
PROT SERPL-MCNC: 7.5 GM/DL (ref 6.4–8.3)
RBC # BLD AUTO: 5.13 X10(6)/MCL (ref 4.2–5.4)
SODIUM SERPL-SCNC: 138 MMOL/L (ref 136–145)
WBC # BLD AUTO: 13.24 X10(3)/MCL (ref 4.5–11.5)

## 2024-10-28 PROCEDURE — 83735 ASSAY OF MAGNESIUM: CPT | Performed by: STUDENT IN AN ORGANIZED HEALTH CARE EDUCATION/TRAINING PROGRAM

## 2024-10-28 PROCEDURE — 63600175 PHARM REV CODE 636 W HCPCS: Performed by: STUDENT IN AN ORGANIZED HEALTH CARE EDUCATION/TRAINING PROGRAM

## 2024-10-28 PROCEDURE — 80053 COMPREHEN METABOLIC PANEL: CPT | Performed by: STUDENT IN AN ORGANIZED HEALTH CARE EDUCATION/TRAINING PROGRAM

## 2024-10-28 PROCEDURE — 96374 THER/PROPH/DIAG INJ IV PUSH: CPT

## 2024-10-28 PROCEDURE — 85025 COMPLETE CBC W/AUTO DIFF WBC: CPT | Performed by: STUDENT IN AN ORGANIZED HEALTH CARE EDUCATION/TRAINING PROGRAM

## 2024-10-28 PROCEDURE — 96375 TX/PRO/DX INJ NEW DRUG ADDON: CPT

## 2024-10-28 PROCEDURE — 99284 EMERGENCY DEPT VISIT MOD MDM: CPT | Mod: 25

## 2024-10-28 RX ORDER — DEXAMETHASONE SODIUM PHOSPHATE 4 MG/ML
8 INJECTION, SOLUTION INTRA-ARTICULAR; INTRALESIONAL; INTRAMUSCULAR; INTRAVENOUS; SOFT TISSUE
Status: COMPLETED | OUTPATIENT
Start: 2024-10-28 | End: 2024-10-28

## 2024-10-28 RX ORDER — KETOROLAC TROMETHAMINE 30 MG/ML
15 INJECTION, SOLUTION INTRAMUSCULAR; INTRAVENOUS
Status: COMPLETED | OUTPATIENT
Start: 2024-10-28 | End: 2024-10-28

## 2024-10-28 RX ORDER — DIPHENHYDRAMINE HYDROCHLORIDE 50 MG/ML
25 INJECTION INTRAMUSCULAR; INTRAVENOUS
Status: COMPLETED | OUTPATIENT
Start: 2024-10-28 | End: 2024-10-28

## 2024-10-28 RX ORDER — PROCHLORPERAZINE EDISYLATE 5 MG/ML
10 INJECTION INTRAMUSCULAR; INTRAVENOUS
Status: COMPLETED | OUTPATIENT
Start: 2024-10-28 | End: 2024-10-28

## 2024-10-28 RX ADMIN — DIPHENHYDRAMINE HYDROCHLORIDE 25 MG: 50 INJECTION INTRAMUSCULAR; INTRAVENOUS at 02:10

## 2024-10-28 RX ADMIN — DEXAMETHASONE SODIUM PHOSPHATE 8 MG: 4 INJECTION, SOLUTION INTRA-ARTICULAR; INTRALESIONAL; INTRAMUSCULAR; INTRAVENOUS; SOFT TISSUE at 02:10

## 2024-10-28 RX ADMIN — PROCHLORPERAZINE EDISYLATE 10 MG: 5 INJECTION INTRAMUSCULAR; INTRAVENOUS at 02:10

## 2024-10-28 RX ADMIN — KETOROLAC TROMETHAMINE 15 MG: 30 INJECTION, SOLUTION INTRAMUSCULAR; INTRAVENOUS at 01:10

## 2024-11-11 ENCOUNTER — HOSPITAL ENCOUNTER (OUTPATIENT)
Dept: RADIOLOGY | Facility: HOSPITAL | Age: 59
Discharge: HOME | End: 2024-11-11
Attending: NURSE PRACTITIONER
Payer: MEDICAID

## 2024-11-11 DIAGNOSIS — G43.E11 CHRONIC MIGRAINE WITH AURA, INTRACTABLE, WITH STATUS MIGRAINOSUS: ICD-10-CM

## 2024-11-11 DIAGNOSIS — G43.109 OCULAR MIGRAINE: ICD-10-CM

## 2024-11-11 PROCEDURE — 25500020 PHARM REV CODE 255: Performed by: NURSE PRACTITIONER

## 2024-11-11 PROCEDURE — 70553 MRI BRAIN STEM W/O & W/DYE: CPT | Mod: TC

## 2024-11-11 PROCEDURE — A9577 INJ MULTIHANCE: HCPCS | Performed by: NURSE PRACTITIONER

## 2024-11-11 RX ADMIN — GADOBENATE DIMEGLUMINE 20 ML: 529 INJECTION, SOLUTION INTRAVENOUS at 12:11

## 2024-11-12 ENCOUNTER — TELEPHONE (OUTPATIENT)
Dept: NEUROLOGY | Facility: CLINIC | Age: 59
End: 2024-11-12
Payer: MEDICAID

## 2024-11-12 ENCOUNTER — TELEPHONE (OUTPATIENT)
Facility: HOSPITAL | Age: 59
End: 2024-11-12
Payer: MEDICAID

## 2024-11-12 NOTE — TELEPHONE ENCOUNTER
Nurse reached out to me via chat. Apparently patient reached out to her for infusion. Will fax orders over to chemo center in [place]. Called spouse and left a message on his .

## 2024-11-12 NOTE — TELEPHONE ENCOUNTER
Patient notified to visit the ED. She verbalized understanding. The patient is asking where is she going for infusions since she can not have it at [facility]?

## 2024-11-12 NOTE — TELEPHONE ENCOUNTER
----- Message from Elvie sent at 11/12/2024 12:20 PM CST -----  [facility] in [place] cannot accept the pt for her Vyepti  infusion. Migraines are still bad.     Callback [phone]

## 2024-11-13 ENCOUNTER — PATIENT MESSAGE (OUTPATIENT)
Dept: NEUROLOGY | Facility: CLINIC | Age: 59
End: 2024-11-13
Payer: MEDICAID

## 2024-11-13 RX ORDER — SODIUM CHLORIDE 9 MG/ML
INJECTION, SOLUTION INTRAVENOUS ONCE AS NEEDED
OUTPATIENT
Start: 2024-11-18

## 2024-11-13 RX ORDER — SODIUM CHLORIDE 0.9 % (FLUSH) 0.9 %
10 SYRINGE (ML) INJECTION
OUTPATIENT
Start: 2024-11-18

## 2024-11-13 RX ORDER — DIPHENHYDRAMINE HYDROCHLORIDE 50 MG/ML
25 INJECTION INTRAMUSCULAR; INTRAVENOUS ONCE AS NEEDED
OUTPATIENT
Start: 2024-11-18

## 2024-11-13 RX ORDER — METHYLPREDNISOLONE SOD SUCC 125 MG
125 VIAL (EA) INJECTION ONCE AS NEEDED
OUTPATIENT
Start: 2024-11-18

## 2024-11-13 RX ORDER — ONDANSETRON HYDROCHLORIDE 2 MG/ML
8 INJECTION, SOLUTION INTRAVENOUS ONCE AS NEEDED
OUTPATIENT
Start: 2024-11-18

## 2024-11-13 RX ORDER — ACETAMINOPHEN 500 MG
1000 TABLET ORAL ONCE AS NEEDED
OUTPATIENT
Start: 2024-11-18

## 2024-11-22 ENCOUNTER — INFUSION (OUTPATIENT)
Facility: HOSPITAL | Age: 59
End: 2024-11-22
Attending: NURSE PRACTITIONER
Payer: MEDICAID

## 2024-11-22 VITALS
DIASTOLIC BLOOD PRESSURE: 90 MMHG | HEIGHT: 62 IN | TEMPERATURE: 98 F | WEIGHT: 196.38 LBS | SYSTOLIC BLOOD PRESSURE: 127 MMHG | OXYGEN SATURATION: 97 % | RESPIRATION RATE: 18 BRPM | HEART RATE: 83 BPM | BODY MASS INDEX: 36.14 KG/M2

## 2024-11-22 DIAGNOSIS — G43.E11 CHRONIC MIGRAINE WITH AURA, INTRACTABLE, WITH STATUS MIGRAINOSUS: Primary | ICD-10-CM

## 2024-11-22 PROCEDURE — 63600175 PHARM REV CODE 636 W HCPCS: Mod: JZ | Performed by: NURSE PRACTITIONER

## 2024-11-22 PROCEDURE — 96365 THER/PROPH/DIAG IV INF INIT: CPT

## 2024-11-22 PROCEDURE — 25000003 PHARM REV CODE 250: Performed by: NURSE PRACTITIONER

## 2024-11-22 RX ORDER — SODIUM CHLORIDE 0.9 % (FLUSH) 0.9 %
10 SYRINGE (ML) INJECTION
Status: DISCONTINUED | OUTPATIENT
Start: 2024-11-22 | End: 2024-11-22 | Stop reason: HOSPADM

## 2024-11-22 RX ORDER — METHYLPREDNISOLONE SOD SUCC 125 MG
125 VIAL (EA) INJECTION ONCE AS NEEDED
OUTPATIENT
Start: 2024-11-22

## 2024-11-22 RX ORDER — DIPHENHYDRAMINE HYDROCHLORIDE 50 MG/ML
25 INJECTION INTRAMUSCULAR; INTRAVENOUS ONCE AS NEEDED
OUTPATIENT
Start: 2024-11-22

## 2024-11-22 RX ORDER — ONDANSETRON HYDROCHLORIDE 2 MG/ML
8 INJECTION, SOLUTION INTRAVENOUS ONCE AS NEEDED
OUTPATIENT
Start: 2024-11-22

## 2024-11-22 RX ORDER — ACETAMINOPHEN 500 MG
1000 TABLET ORAL ONCE AS NEEDED
OUTPATIENT
Start: 2024-11-22

## 2024-11-22 RX ORDER — SODIUM CHLORIDE 9 MG/ML
INJECTION, SOLUTION INTRAVENOUS ONCE AS NEEDED
OUTPATIENT
Start: 2024-11-22

## 2024-11-22 RX ORDER — SODIUM CHLORIDE 0.9 % (FLUSH) 0.9 %
10 SYRINGE (ML) INJECTION
OUTPATIENT
Start: 2024-11-22

## 2024-11-22 RX ADMIN — EPTINEZUMAB-JJMR 300 MG: 100 INJECTION INTRAVENOUS at 08:11

## 2024-11-22 RX ADMIN — SODIUM CHLORIDE: 9 INJECTION, SOLUTION INTRAVENOUS at 08:11

## 2024-11-22 NOTE — NURSING
Vyepti IVPB administered, pt tolerated well. Pt discharged home in stable condition. Pt given next appt.

## 2024-12-23 ENCOUNTER — PATIENT MESSAGE (OUTPATIENT)
Dept: NEUROLOGY | Facility: CLINIC | Age: 59
End: 2024-12-23

## 2024-12-23 ENCOUNTER — OFFICE VISIT (OUTPATIENT)
Dept: NEUROLOGY | Facility: CLINIC | Age: 59
End: 2024-12-23
Payer: MEDICAID

## 2024-12-23 DIAGNOSIS — G43.E11 CHRONIC MIGRAINE WITH AURA, INTRACTABLE, WITH STATUS MIGRAINOSUS: Primary | ICD-10-CM

## 2024-12-23 DIAGNOSIS — E66.811 CLASS 1 DRUG-INDUCED OBESITY WITHOUT SERIOUS COMORBIDITY WITH BODY MASS INDEX (BMI) OF 32.0 TO 32.9 IN ADULT: ICD-10-CM

## 2024-12-23 DIAGNOSIS — E66.1 CLASS 1 DRUG-INDUCED OBESITY WITHOUT SERIOUS COMORBIDITY WITH BODY MASS INDEX (BMI) OF 32.0 TO 32.9 IN ADULT: ICD-10-CM

## 2024-12-23 DIAGNOSIS — G43.109 OCULAR MIGRAINE: ICD-10-CM

## 2024-12-23 PROCEDURE — 99214 OFFICE O/P EST MOD 30 MIN: CPT | Mod: 95,,, | Performed by: NURSE PRACTITIONER

## 2024-12-23 PROCEDURE — 1160F RVW MEDS BY RX/DR IN RCRD: CPT | Mod: CPTII,95,, | Performed by: NURSE PRACTITIONER

## 2024-12-23 PROCEDURE — 1159F MED LIST DOCD IN RCRD: CPT | Mod: CPTII,95,, | Performed by: NURSE PRACTITIONER

## 2024-12-23 RX ORDER — KETOROLAC TROMETHAMINE 10 MG/1
TABLET, FILM COATED ORAL
COMMUNITY

## 2024-12-23 RX ORDER — LORAZEPAM 1 MG/1
TABLET ORAL
COMMUNITY
Start: 2024-10-25

## 2024-12-23 RX ORDER — TIRZEPATIDE 12.5 MG/.5ML
INJECTION, SOLUTION SUBCUTANEOUS
COMMUNITY
Start: 2024-12-04

## 2024-12-23 RX ORDER — DICLOFENAC SODIUM 75 MG/1
75 TABLET, DELAYED RELEASE ORAL 2 TIMES DAILY PRN
COMMUNITY

## 2024-12-23 RX ORDER — ESCITALOPRAM OXALATE 10 MG/1
1 TABLET ORAL DAILY
COMMUNITY

## 2024-12-23 NOTE — PROGRESS NOTES
[facility] Neurology Telemedicine Follow Up Visit Note    Initial Visit Date: 12/7/2023  Last Visit Date: 10/22/2024  Current Visit Date:  12/23/2024    This is a real-time audio/video visit that was performed with the originating site at patient's home and the distant site, [facility]. Verbal consent to participate in interactive audio & video visit was obtained.    I discussed with the patient regarding the nature of our telehealth visits, that:    - Our sessions are not being recorded and that personal health information is protected  - Provider would evaluate the patient and recommend diagnostics and treatments based on my assessment  - [facility] will provide follow up care in person if/when the patient needs it.     Chief Complaint:     Chief Complaint   Patient presents with    Chronic migraine with aura, intractable, with status migrai     Pt states that her migraines come for maybe 5 mins and then they're gone for hours. She states that its little pains for around 5 mins then gone for a couple hours. She states that her pains are no longer a 10, its like a 3 or 4. She states that today is the only day that she has more of a headache, but she feels it more of her sinuses than migraines. She states that things are a lot better.      History of Present Illness:      This is 59 y.o.  female with hx of COVID 6/2022, HTN, seizure, migraines, cholesteatoma, chronic ear infection, tympanomastoidectomy, HANAN on CPAP, R CWU mastoidectomy who was referred headache disorder. Previously followed by Dr. Han for Botox injections. Currently c/o migraine accompanied by dizziness and OS ptosis. During last visit, Ubrelvy 100mg PO BID PRN, TPM 50mg PO BID and Nurtec 75mg ODT PO QOD were continued, LTG was titrated up to 50mg PO BID. MRI brain was ordered, Valium 2mg PO BID, Dexamethasone 4mg PO BID x 5 days was ordered.  Pt called  "w/severe migraine and current tx was ineffective. Vyepti infusion was ordered.     Today, Pt states her migraines have improved since starting Vyepti in November, had been to several EDs for medications prior to this. Admits to a HA for 3-5 minutes then gone for hours. Is able to be more active now. Currently taking LTG 25mg PO BID, no longer on Nurtec QOD. Requires Ubrelvy sparingly at this point although effective as abortive therapy. Pleased with infusion. Admits to two seizures since last visit due to caring for father who passed away, forgot to take her medication.    10/22/2024:  HA daily since August. Is taking Ubrelvy daily. Was seen in ED in [facility] on 10/11/2024 and given phenergan and Dilauded which she states only "dulled" the pain. Denies any other symptoms w/migraine. Called office twice and Rx for Depakote x 5 days and ketorolac were sent. Pt states she received minimal relief. Spouse requesting Vyepti infusion. Denies exacerbating factors. Has been unable to drive. Continues to take LTG 25mg PO BID. States she has been to her ENT, PCP with no relief. Utilizes CPAP nightly. States she also had a seizure 2 weeks ago she attributes to severe migraine. PCP started her back on Keppra 750mg PO Qday. No seizures since then.    Previous visit:  Admits to 2-3 seizures in last month due to severe HA. Has weaned off Keppra as "I don't like taking drugs" denies witnessed seizure activity. Would like to keep on EMR in event she needs to restart. Intermittent pain to L temporal area radiating distally in front of tragus. Denies jaw pain. No complaint today.    Age of Onset : 5-7 YO    Headache Description: L frontal and temporal area, cannot describe type, accompanied by nausea/vomiting/photophobia/phonophobia, worsened w/activity, must lay in bed may last up to 3 days if does not take any medication, severe in nature, also accompanied by sparkles and zig zag to only one eye    Frequency: 18-19 headache days " per month; daily since August 2024; decreased in intensity on Vyepti although daily    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  - History head trauma: Yes fell  - History of underlying mood disorder: Yes anxiety/depression  - History of sleep disorder: Yes HANNA on CPAP  - Recreational drug use: No  - Tobacco use: No  - Alcohol use: No  - Weight fluctuation: No  - Isotretinoin or Tetracycline use:  Unknown  - Family planning and contraceptive use: Not Applicable    # Seizure  Age of Onset : 5 yrs ago after fell and hit head    Semiology: R arms moves up and down, head tilts backward, body stiffens, a moan or utterance, lasts 2 minutes, jerking stops, sleeps afterward, lost bladder once    Frequency: 48 events/month prior to treatment, GTC frequency 1 events/4-6 months    Provocation Factors: stress    Risk Factors  - Family history of seizure disorders:  Yes niece  - History of focal CNS lesions: No  - History of CNS infections: Yes encephalitis  - History head trauma with prolonged LOC: yes after 1st seizures  - History of childhood seizures, including febrile seizures: No  - History of development delay: No   - History of underlying mood disorder: Yes depression/anxiety   - History of sleep disorder: Yes HANNA on CPAP  - Recreational drug use: No  - Alcohol use: No  - Family planning and contraceptive use: Not Applicable   Medications:     Current Prophylactic  LTG 25mg PO BID (3/12/2024 - present) - effective at this time   TPM 50 mg PO BID (1/7/2024 - present) - effective for migraine  Keppra 750mg PO Qday (1/17/2024 - present) effective  Metoprolol 50mg PO daily (2/18/2024 - present)    Current Abortive  Ubrelvy 100mg PO BID PRN (12/7/2023 - present) - effective     Prior Prophylactic  Aimovig - ineffective  Botox - 2021 -  ineffective  Emgality - nausea  Depakote XR 500mg nightly (3-2022 - 10/2022) - ineffective  Qulipta 60mg PO Qday (12/7/2023 - 3/12/2024) ineffective  Metoprolol 50mg daily - ineffective for migraine  Nurtec 75mg PO QOD (10/23/2024 - 11/23/2024) ineffective    Prior Abortive  Rizatriptan 10mg O BID - ineffective  Nurtec 75mg ODT QOD (10/23/2024 - 11/23/2024) - partially effective; did not wish to continue  Sumatriptan nasal spray - somewhat effective  Ondansetron PRN - ineffective  Medications:     Current Anti-Seizure Medication(s):  Keppra 750gm PO Qday (10/2024 - present) effective    Prior Anti-Seizure Medication(s):  Depakote XR 500mg nighlty  Keppra XR 1500mg daily - weaned off 4/2024    Surgical Intervention & Devices:     - VNS:  - DBS  - RNS:  - Lobectomy:    Studies:      - MRI brain +/- Nathan 11/11/2024: no acute intracranial abnormality  - MRI IAC/Temporal Bones 12/19/2022 - persistent T2 hyperintensity in the R mastoidectomy cavity which does have central hypointensity  - MRI Brain: 10/11/2017 - minimal asymmetry in the size of the temporal horns of the lateral ventricles. Otherwise, no acute intracranial findings.  - MRA Head w/o Nathan:   - MRV Head w/o Nathan:   - NCHCT:  - Lumbar Puncture:    - routine EEG 12/21/2023 - no epileptiform activity  - one hour EEG:   - 24 hour EEG:  - Ambulatory EEG:  - EMU Video EEG:  - MRI Brain:   - NCHCT:  - WADA:    Review of Systems:     ROS  As per HPI. All other systems negative  Physical Exams:     There were no vitals filed for this visit.    Physical Exam  HENT:      Head: Normocephalic.      Right Ear: External ear normal.      Left Ear: External ear normal.      Nose: Nose normal.      Mouth/Throat:      Mouth: Mucous membranes are moist.      Pharynx: Oropharynx is clear.   Eyes:      Conjunctiva/sclera: Conjunctivae normal.   Cardiovascular:      Rate and Rhythm: Normal rate and regular rhythm.   Pulmonary:      Effort: Pulmonary effort is normal.   Abdominal:       General: There is no distension.      Tenderness: There is no guarding.      Comments: round   Musculoskeletal:         General: Normal range of motion.      Cervical back: Normal range of motion.   Skin:     Coloration: Skin is not jaundiced.      Findings: No lesion or rash.   Neurological:      General: No focal deficit present.      Mental Status: She is alert.   Psychiatric:         Mood and Affect: Mood normal.     Comprehensive Neurological Exam:  Mental Status: Alert Oriented to Self, Date, and Place. Naming, repetition, and reading wnl. Comprehension wnl. No dysarthria.   CN II - XII: OS ptosis, EOMI without nystagmus, Hearing grossly intact, Face Symmetric, Tongue and Uvula midline, Trapezius symmetric bilateral.   Motor: tone and bulk wnl throughout, no abnormal involuntary or voluntary movements, fine finger movements wnl b/l, No pronator drift.   Sensory: ISATU due to nature of visit   Reflexes: ISATU due to nature of visit   Cerebellar: FNF wnl b/l  Gait: normal, bilat arm swing intact    Assessment:     This is 59 y.o.  female with history of COVID 6/2022, HTN, pseudoseizure, migraines, cholesteatoma, chronic ear infection, R CWU mastoidectomy, who was referred headache disorder. Previously received Botox w/Dr. Han. HA are migraine w/aura, intractable and ocular in nature. Pt admits to improvement in migraine intensity since starting Vyepti in November. HA may last 5 minutes then resolves, although continue daily. States she is able to be more active and participate in family activities. No longer taking Nurtec QOD, does not like to take medication. Admits to two seizures since last visit in the setting of missing medication. Is caring for father and was stressed. Continues to take Keppra 750mg PO daily. Only taking LTG 25mg PO BID instead of 50mg PO BID. Continues w/TPM 50mg PO BID    Problem List Items Addressed This Visit          Neuro    Chronic migraine with aura, intractable, with  status migrainosus - Primary    Ocular migraine       Endocrine    Class 1 drug-induced obesity without serious comorbidity with body mass index (BMI) of 32.0 to 32.9 in adult       Plan:   [] c/w Vyepti infusion 300mg every 3 mths  [] c/w phenergan 50mg PO BID PRN nausea  [] c/w Ubrelvy 100mg PO BID PRN at onset of migraine as abortive therapy  [] c/w LTG to 25mg BID  [] c/w TPM 50mg PO BID  [] call office for migraine >24 hrs and failed abortive therapy; will call in HA cocktail  [] hold ESR, CRP to r/o temporal arteritis     RTC 4 mths - TM okay    Headache education provided: good sleep hygiene and 7 hours of sleep per night, stress management, medication overuse education provided. Using more 3 OTC per week may worsen headaches, high intensity interval training has shown to reduce headache frequency. Low carb, high protein has shown to reduce headache frequency. Patient is instructed in keep headache diary.     I have explained the treatment plan, diagnosis, and prognosis to patient. All questions are answered to the best of my knowledge.     Visit today is associated with current or anticipated ongoing medical care related to this patient's single serious condition/complex condition as documented above.     Face to face time 30 minutes, including documentation, chart review, counseling, education, review of test results, relevant medical records, and coordination of care.     Lorrie Troncoso NP-C  General Neurology  12/23/2024

## 2025-02-21 ENCOUNTER — INFUSION (OUTPATIENT)
Facility: HOSPITAL | Age: 60
End: 2025-02-21
Attending: NURSE PRACTITIONER
Payer: MEDICAID

## 2025-02-21 VITALS
WEIGHT: 184 LBS | OXYGEN SATURATION: 97 % | TEMPERATURE: 98 F | BODY MASS INDEX: 33.86 KG/M2 | HEART RATE: 90 BPM | RESPIRATION RATE: 20 BRPM | SYSTOLIC BLOOD PRESSURE: 128 MMHG | DIASTOLIC BLOOD PRESSURE: 89 MMHG | HEIGHT: 62 IN

## 2025-02-21 DIAGNOSIS — G43.E11 CHRONIC MIGRAINE WITH AURA, INTRACTABLE, WITH STATUS MIGRAINOSUS: Primary | ICD-10-CM

## 2025-02-21 PROCEDURE — 96365 THER/PROPH/DIAG IV INF INIT: CPT

## 2025-02-21 PROCEDURE — 63600175 PHARM REV CODE 636 W HCPCS: Mod: JZ | Performed by: NURSE PRACTITIONER

## 2025-02-21 PROCEDURE — 25000003 PHARM REV CODE 250: Performed by: NURSE PRACTITIONER

## 2025-02-21 RX ORDER — ACETAMINOPHEN 500 MG
1000 TABLET ORAL ONCE AS NEEDED
OUTPATIENT
Start: 2025-02-21

## 2025-02-21 RX ORDER — METHYLPREDNISOLONE SOD SUCC 125 MG
125 VIAL (EA) INJECTION ONCE AS NEEDED
OUTPATIENT
Start: 2025-02-21

## 2025-02-21 RX ORDER — DIPHENHYDRAMINE HYDROCHLORIDE 50 MG/ML
25 INJECTION INTRAMUSCULAR; INTRAVENOUS ONCE AS NEEDED
OUTPATIENT
Start: 2025-02-21

## 2025-02-21 RX ORDER — SODIUM CHLORIDE 0.9 % (FLUSH) 0.9 %
10 SYRINGE (ML) INJECTION
Status: DISCONTINUED | OUTPATIENT
Start: 2025-02-21 | End: 2025-02-21 | Stop reason: HOSPADM

## 2025-02-21 RX ORDER — ONDANSETRON HYDROCHLORIDE 2 MG/ML
8 INJECTION, SOLUTION INTRAVENOUS ONCE AS NEEDED
OUTPATIENT
Start: 2025-02-21

## 2025-02-21 RX ORDER — SODIUM CHLORIDE 9 MG/ML
INJECTION, SOLUTION INTRAVENOUS ONCE AS NEEDED
OUTPATIENT
Start: 2025-02-21

## 2025-02-21 RX ORDER — SODIUM CHLORIDE 0.9 % (FLUSH) 0.9 %
10 SYRINGE (ML) INJECTION
OUTPATIENT
Start: 2025-02-21

## 2025-02-21 RX ADMIN — SODIUM CHLORIDE 300 MG: 9 INJECTION, SOLUTION INTRAVENOUS at 01:02

## 2025-02-21 RX ADMIN — SODIUM CHLORIDE: 9 INJECTION, SOLUTION INTRAVENOUS at 12:02

## 2025-04-22 ENCOUNTER — TELEPHONE (OUTPATIENT)
Dept: NEUROLOGY | Facility: CLINIC | Age: 60
End: 2025-04-22
Payer: MEDICAID

## 2025-04-28 ENCOUNTER — PATIENT MESSAGE (OUTPATIENT)
Dept: NEUROLOGY | Facility: CLINIC | Age: 60
End: 2025-04-28

## 2025-04-28 ENCOUNTER — OFFICE VISIT (OUTPATIENT)
Dept: NEUROLOGY | Facility: CLINIC | Age: 60
End: 2025-04-28
Payer: MEDICAID

## 2025-04-28 DIAGNOSIS — G43.109 OCULAR MIGRAINE: ICD-10-CM

## 2025-04-28 DIAGNOSIS — G43.E11 CHRONIC MIGRAINE WITH AURA, INTRACTABLE, WITH STATUS MIGRAINOSUS: Primary | ICD-10-CM

## 2025-04-28 PROBLEM — J45.20 MILD INTERMITTENT ASTHMA: Status: ACTIVE | Noted: 2024-11-19

## 2025-04-28 PROBLEM — K59.09 CHRONIC CONSTIPATION: Status: ACTIVE | Noted: 2023-08-28

## 2025-04-28 PROBLEM — M19.90 IDIOPATHIC OSTEOARTHRITIS: Status: ACTIVE | Noted: 2024-07-17

## 2025-04-28 PROBLEM — K58.9 IRRITABLE BOWEL SYNDROME: Status: ACTIVE | Noted: 2024-11-19

## 2025-04-28 PROBLEM — M17.12 OSTEOARTHRITIS OF LEFT KNEE: Status: ACTIVE | Noted: 2025-02-11

## 2025-04-28 PROBLEM — H90.6 MIXED CONDUCTIVE AND SENSORINEURAL HEARING LOSS OF BOTH EARS: Status: ACTIVE | Noted: 2021-10-25

## 2025-04-28 PROBLEM — R73.03 PREDIABETES: Status: ACTIVE | Noted: 2023-08-28

## 2025-04-28 PROBLEM — F90.0 ATTENTION DEFICIT HYPERACTIVITY DISORDER, PREDOMINANTLY INATTENTIVE TYPE: Status: ACTIVE | Noted: 2025-01-14

## 2025-04-28 PROBLEM — G40.309 NONINTRACTABLE GENERALIZED IDIOPATHIC EPILEPSY WITHOUT STATUS EPILEPTICUS: Status: ACTIVE | Noted: 2025-04-28

## 2025-04-28 PROBLEM — F41.1 GENERALIZED ANXIETY DISORDER: Status: ACTIVE | Noted: 2023-12-13

## 2025-04-28 PROBLEM — I10 ESSENTIAL HYPERTENSION: Status: ACTIVE | Noted: 2024-11-19

## 2025-04-28 RX ORDER — EPTINEZUMAB-JJMR 100 MG/ML
300 INJECTION INTRAVENOUS
Qty: 3 ML
Start: 2025-04-28

## 2025-04-28 RX ORDER — METRONIDAZOLE 7.5 MG/G
1 CREAM TOPICAL 2 TIMES DAILY
COMMUNITY
Start: 2025-04-23

## 2025-04-28 RX ORDER — GUAIFENESIN 600 MG/1
TABLET, EXTENDED RELEASE ORAL
COMMUNITY
Start: 2025-02-19

## 2025-04-28 RX ORDER — OFLOXACIN 3 MG/ML
SOLUTION/ DROPS OPHTHALMIC
COMMUNITY
End: 2025-04-28 | Stop reason: SDUPTHER

## 2025-04-28 RX ORDER — IBUPROFEN 800 MG/1
800 TABLET ORAL
COMMUNITY
Start: 2025-04-12

## 2025-04-28 RX ORDER — DEXAMETHASONE SODIUM PHOSPHATE 4 MG/ML
INJECTION, SOLUTION INTRA-ARTICULAR; INTRALESIONAL; INTRAMUSCULAR; INTRAVENOUS; SOFT TISSUE
COMMUNITY
Start: 2025-02-19

## 2025-04-28 RX ORDER — DOXYCYCLINE 100 MG/1
100 CAPSULE ORAL 2 TIMES DAILY
COMMUNITY
Start: 2025-02-12 | End: 2025-04-28

## 2025-04-28 NOTE — PROGRESS NOTES
[facility] Neurology Telemedicine Follow Up Visit Note    Initial Visit Date: 12/7/2023  Last Visit Date: 12/23/2024  Current Visit Date:  04/28/2025    This is a real-time audio/video visit that was performed with the originating site at patient's home and the distant site, [facility]. Verbal consent to participate in interactive audio & video visit was obtained.    I discussed with the patient regarding the nature of our telehealth visits, that:    - Our sessions are not being recorded and that personal health information is protected  - Provider would evaluate the patient and recommend diagnostics and treatments based on my assessment  - [facility] will provide follow up care in person if/when the patient needs it.     Chief Complaint:     Chief Complaint   Patient presents with    Chronic migraine with aura, intractable, with status migrai     History of Present Illness:      This is 59 y.o.  female with hx of COVID 6/2022, HTN, seizure, migraines, IBS, OA L knee, idiopathic OA, cholesteatoma, chronic ear infection, tympanomastoidectomy, HANNA on CPAP, R CWU mastoidectomy who was referred headache disorder. Previously followed by Dr. Han for Botox injections. Currently c/o migraine accompanied by dizziness and OS ptosis. During last visit, Vyepti 300mg IV Q3 mths was continued, Ubrelvy 100mg PO BID PRN, TPM 50mg PO BID and Nurtec 75mg ODT PO QOD was discontnued and, LTG 25mg PO BID was cotinued    Today, Pt denies migraine since initiation of Vyepti. May have a sharp pain in her head on occasion in setting of stress or fatiue. But short lived and does not have time to get Ubrelvy. No longer on TPM. Currently taking LTG 25mg PO BID. Pleased with infusion. Admits to one or two seizures since last visit due increased stress and severe fatigue. Lots of travelling. Next infusion in May. Admits to decreased water  "intake.    10/22/2024:  HA daily since August. Is taking Ubrelvy daily. Was seen in ED in [facility] on 10/11/2024 and given phenergan and Dilauded which she states only "dulled" the pain. Denies any other symptoms w/migraine. Called office twice and Rx for Depakote x 5 days and ketorolac were sent. Pt states she received minimal relief. Spouse requesting Vyepti infusion. Denies exacerbating factors. Has been unable to drive. Continues to take LTG 25mg PO BID. States she has been to her ENT, PCP with no relief. Utilizes CPAP nightly. States she also had a seizure 2 weeks ago she attributes to severe migraine. PCP started her back on Keppra 750mg PO Qday. No seizures since then.    Previous visit:  Admits to 2-3 seizures in last month due to severe HA. Has weaned off Keppra as "I don't like taking drugs" denies witnessed seizure activity. Would like to keep on EMR in event she needs to restart. Intermittent pain to L temporal area radiating distally in front of tragus. Denies jaw pain. No complaint today.    Age of Onset : 5-5 YO    Headache Description: L frontal and temporal area, cannot describe type, accompanied by nausea/vomiting/photophobia/phonophobia, worsened w/activity, must lay in bed may last up to 3 days if does not take any medication, severe in nature, also accompanied by sparkles and zig zag to only one eye    Frequency: 18-19 headache days per month; daily since August 2024; decreased in intensity on Vyepti although daily    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  - History head trauma: Yes fell  - History of underlying mood disorder: Yes anxiety/depression  - History of sleep disorder: Yes HANNA on CPAP  - Recreational drug use: No  - Tobacco use: No  - Alcohol use: No  - Weight fluctuation: No  - Isotretinoin or " Tetracycline use:  Unknown  - Family planning and contraceptive use: Not Applicable    # Seizure  Age of Onset : 5 yrs ago after fell and hit head    Semiology: R arms moves up and down, head tilts backward, body stiffens, a moan or utterance, lasts 2 minutes, jerking stops, sleeps afterward, lost bladder once    Frequency: 48 events/month prior to treatment, GTC frequency 1 events/4-6 months    Provocation Factors: stress    Risk Factors  - Family history of seizure disorders:  Yes niece  - History of focal CNS lesions: No  - History of CNS infections: Yes encephalitis  - History head trauma with prolonged LOC: yes after 1st seizures  - History of childhood seizures, including febrile seizures: No  - History of development delay: No   - History of underlying mood disorder: Yes depression/anxiety   - History of sleep disorder: Yes HANNA on CPAP  - Recreational drug use: No  - Alcohol use: No  - Family planning and contraceptive use: Not Applicable   Medications:     Current Prophylactic  LTG 25mg PO BID (3/12/2024 - present) - effective at this time   Keppra 750mg PO Qday (1/17/2024 - present) effective  Metoprolol 50mg PO daily (2/18/2024 - present)  Vyepti 300mg IV Q3 mths (11/2024 - present) effective    Current Abortive  Ubrelvy 100mg PO BID PRN (12/7/2023 - present) - effective     Prior Prophylactic  Aimovig - ineffective  Botox - 2021 - ineffective  Emgality - nausea  Depakote XR 500mg nightly (3-2022 - 10/2022) - ineffective  Qulipta 60mg PO Qday (12/7/2023 - 3/12/2024) ineffective  Metoprolol 50mg daily - ineffective for migraine  Nurtec 75mg PO QOD (10/23/2024 - 11/23/2024) ineffective  TPM 50 mg PO BID (1/7/2024 - 4/12/2025) - no longer required    Prior Abortive  Rizatriptan 10mg O BID - ineffective  Nurtec 75mg ODT QOD (10/23/2024 - 11/23/2024) - partially effective; did not wish to continue  Sumatriptan nasal spray - somewhat effective  Ondansetron PRN - ineffective  Medications:     Current Anti-Seizure  Medication(s):  Keppra 750gm PO Qday (10/2024 - present) effective    Prior Anti-Seizure Medication(s):  Depakote XR 500mg nighlty  Keppra XR 1500mg daily - weaned off 4/2024    Surgical Intervention & Devices:     - VNS:  - DBS  - RNS:  - Lobectomy:    Studies:      - MRI brain +/- Nathan 11/11/2024: no acute intracranial abnormality  - MRI IAC/Temporal Bones 12/19/2022 - persistent T2 hyperintensity in the R mastoidectomy cavity which does have central hypointensity  - MRI Brain: 10/11/2017 - minimal asymmetry in the size of the temporal horns of the lateral ventricles. Otherwise, no acute intracranial findings.  - MRA Head w/o Nathan:   - MRV Head w/o Nathan:   - NCHCT:  - Lumbar Puncture:    - routine EEG 12/21/2023 - no epileptiform activity  - one hour EEG:   - 24 hour EEG:  - Ambulatory EEG:  - EMU Video EEG:  - MRI Brain:   - NCHCT:  - WADA:    Review of Systems:     ROS  As per HPI. All other systems negative  Physical Exams:     There were no vitals filed for this visit.    Physical Exam  HENT:      Head: Normocephalic.      Right Ear: External ear normal.      Left Ear: External ear normal.      Nose: Nose normal.      Mouth/Throat:      Mouth: Mucous membranes are moist.      Pharynx: Oropharynx is clear.   Eyes:      Conjunctiva/sclera: Conjunctivae normal.   Pulmonary:      Effort: Pulmonary effort is normal.   Abdominal:      General: Abdomen is flat. There is no distension.      Tenderness: There is no guarding.      Comments: round   Musculoskeletal:         General: Normal range of motion.      Cervical back: Normal range of motion.   Skin:     Coloration: Skin is not jaundiced.      Findings: No lesion or rash.   Neurological:      General: No focal deficit present.      Mental Status: She is alert.   Psychiatric:         Mood and Affect: Mood normal.     Comprehensive Neurological Exam:  Mental Status: Alert Oriented to Self, Date, and Place. Naming, repetition, and reading wnl. Comprehension wnl. No  dysarthria.   CN II - XII: OS ptosis, EOMI without nystagmus, Hearing grossly intact, Face Symmetric, Tongue and Uvula midline, Trapezius symmetric bilateral.   Motor: tone and bulk wnl throughout, no abnormal involuntary or voluntary movements, fine finger movements wnl b/l, No pronator drift.   Sensory: ISATU due to nature of visit   Reflexes: ISATU due to nature of visit   Cerebellar: FNF wnl b/l  Gait: normal, bilat arm swing intact    Assessment:     This is 59 y.o.  female with history of COVID 6/2022, HTN, pseudoseizure, IBS, OA L knee, idiopathic OA, migraines, cholesteatoma, chronic ear infection, R CWU mastoidectomy, who was referred headache disorder. Previously received Botox w/Dr. Han. HA are migraine w/aura, intractable and ocular in nature. Pt denies migraine since initiation of Vyepti. Has not required Ubrelvy. States she is able to be more active and participate in family activities. Admits to 1-2 seizures since last visit in the setting of increased stress (Rx via PCP). Continues to take Keppra 750mg PO daily. Taking LTG 25mg PO BID. Discontinued TPM on her own. Please d with current tx regimen.    Problem List Items Addressed This Visit          Neuro    Chronic migraine with aura, intractable, with status migrainosus - Primary (Chronic)    Relevant Medications    eptinezumab-jjmr (VYEPTI) 100 mg/mL Soln    Ocular migraine (Chronic)     Plan:   [] c/w Vyepti infusion 300mg every 3 mths  [] c/w phenergan 50mg PO BID PRN nausea  [] c/w Ubrelvy 100mg PO BID PRN at onset of migraine as abortive therapy  [] c/w LTG to 25mg BID  [] d/c TPM 50mg PO BID; dc'd on her own  [] call office for migraine >24 hrs and failed abortive therapy; will call in HA cocktail  [] hold ESR, CRP to r/o temporal arteritis     RTC 6 mths - TM okay    Headache education provided: good sleep hygiene and 7 hours of sleep per night, stress management, medication overuse education provided. Using more 3 OTC per week may  worsen headaches, high intensity interval training has shown to reduce headache frequency. Low carb, high protein has shown to reduce headache frequency. Patient is instructed in keep headache diary.     I have explained the treatment plan, diagnosis, and prognosis to patient. All questions are answered to the best of my knowledge.     Visit today is associated with current or anticipated ongoing medical care related to this patient's single serious condition/complex condition as documented above.     Face to face time 30 minutes, including documentation, chart review, counseling, education, review of test results, relevant medical records, and coordination of care.     Lorrie COLEC  General Neurology  04/28/2025

## 2025-05-21 ENCOUNTER — INFUSION (OUTPATIENT)
Facility: HOSPITAL | Age: 60
End: 2025-05-21
Attending: NURSE PRACTITIONER
Payer: MEDICAID

## 2025-05-21 VITALS
DIASTOLIC BLOOD PRESSURE: 83 MMHG | OXYGEN SATURATION: 96 % | BODY MASS INDEX: 33.53 KG/M2 | HEART RATE: 76 BPM | SYSTOLIC BLOOD PRESSURE: 123 MMHG | HEIGHT: 62 IN | WEIGHT: 182.19 LBS | TEMPERATURE: 97 F | RESPIRATION RATE: 18 BRPM

## 2025-05-21 DIAGNOSIS — G43.E11 CHRONIC MIGRAINE WITH AURA, INTRACTABLE, WITH STATUS MIGRAINOSUS: Primary | ICD-10-CM

## 2025-05-21 PROCEDURE — 63600175 PHARM REV CODE 636 W HCPCS: Mod: JZ | Performed by: NURSE PRACTITIONER

## 2025-05-21 PROCEDURE — 25000003 PHARM REV CODE 250: Performed by: NURSE PRACTITIONER

## 2025-05-21 PROCEDURE — 96365 THER/PROPH/DIAG IV INF INIT: CPT

## 2025-05-21 RX ORDER — ACETAMINOPHEN 500 MG
1000 TABLET ORAL ONCE AS NEEDED
OUTPATIENT
Start: 2025-05-21

## 2025-05-21 RX ORDER — SODIUM CHLORIDE 9 MG/ML
INJECTION, SOLUTION INTRAVENOUS ONCE AS NEEDED
OUTPATIENT
Start: 2025-05-21

## 2025-05-21 RX ORDER — SODIUM CHLORIDE 0.9 % (FLUSH) 0.9 %
10 SYRINGE (ML) INJECTION
OUTPATIENT
Start: 2025-05-21

## 2025-05-21 RX ORDER — SODIUM CHLORIDE 0.9 % (FLUSH) 0.9 %
10 SYRINGE (ML) INJECTION
Status: DISCONTINUED | OUTPATIENT
Start: 2025-05-21 | End: 2025-05-21 | Stop reason: HOSPADM

## 2025-05-21 RX ORDER — ONDANSETRON HYDROCHLORIDE 2 MG/ML
8 INJECTION, SOLUTION INTRAVENOUS ONCE AS NEEDED
OUTPATIENT
Start: 2025-05-21

## 2025-05-21 RX ORDER — METHYLPREDNISOLONE SOD SUCC 125 MG
125 VIAL (EA) INJECTION ONCE AS NEEDED
OUTPATIENT
Start: 2025-05-21

## 2025-05-21 RX ORDER — DIPHENHYDRAMINE HYDROCHLORIDE 50 MG/ML
25 INJECTION, SOLUTION INTRAMUSCULAR; INTRAVENOUS ONCE AS NEEDED
OUTPATIENT
Start: 2025-05-21

## 2025-05-21 RX ADMIN — SODIUM CHLORIDE: 9 INJECTION, SOLUTION INTRAVENOUS at 01:05

## 2025-05-21 RX ADMIN — SODIUM CHLORIDE 300 MG: 9 INJECTION, SOLUTION INTRAVENOUS at 01:05

## 2025-05-21 NOTE — NURSING
Pt reports developing mild rash to fash approximately 1 hour after last infusion. States no other symptoms, took Benadryl and rash subsided. Will notify pts ordering MD.

## 2025-05-21 NOTE — NURSING
vyepti administered, pt tolerated well. Pt discharged home in stable condition. No rash at this time.

## 2025-08-21 ENCOUNTER — INFUSION (OUTPATIENT)
Facility: HOSPITAL | Age: 60
End: 2025-08-21
Attending: NURSE PRACTITIONER
Payer: MEDICAID

## 2025-08-21 VITALS
SYSTOLIC BLOOD PRESSURE: 112 MMHG | OXYGEN SATURATION: 98 % | RESPIRATION RATE: 18 BRPM | WEIGHT: 173 LBS | HEART RATE: 79 BPM | BODY MASS INDEX: 31.83 KG/M2 | DIASTOLIC BLOOD PRESSURE: 72 MMHG | HEIGHT: 62 IN | TEMPERATURE: 98 F

## 2025-08-21 DIAGNOSIS — G43.E11 CHRONIC MIGRAINE WITH AURA, INTRACTABLE, WITH STATUS MIGRAINOSUS: Primary | ICD-10-CM

## 2025-08-21 PROCEDURE — 25000003 PHARM REV CODE 250: Performed by: NURSE PRACTITIONER

## 2025-08-21 PROCEDURE — 96365 THER/PROPH/DIAG IV INF INIT: CPT

## 2025-08-21 PROCEDURE — 63600175 PHARM REV CODE 636 W HCPCS: Mod: JZ | Performed by: NURSE PRACTITIONER

## 2025-08-21 RX ORDER — ACETAMINOPHEN 500 MG
1000 TABLET ORAL ONCE AS NEEDED
OUTPATIENT
Start: 2025-08-21 | End: 2037-01-17

## 2025-08-21 RX ORDER — SODIUM CHLORIDE 0.9 % (FLUSH) 0.9 %
10 SYRINGE (ML) INJECTION
OUTPATIENT
Start: 2025-08-21

## 2025-08-21 RX ORDER — ONDANSETRON HYDROCHLORIDE 2 MG/ML
8 INJECTION, SOLUTION INTRAVENOUS ONCE AS NEEDED
OUTPATIENT
Start: 2025-08-21 | End: 2037-01-17

## 2025-08-21 RX ORDER — SODIUM CHLORIDE 0.9 % (FLUSH) 0.9 %
10 SYRINGE (ML) INJECTION
Status: DISCONTINUED | OUTPATIENT
Start: 2025-08-21 | End: 2025-08-21 | Stop reason: HOSPADM

## 2025-08-21 RX ORDER — SODIUM CHLORIDE 9 MG/ML
INJECTION, SOLUTION INTRAVENOUS ONCE AS NEEDED
OUTPATIENT
Start: 2025-08-21 | End: 2037-01-17

## 2025-08-21 RX ORDER — METHYLPREDNISOLONE SOD SUCC 125 MG
125 VIAL (EA) INJECTION ONCE AS NEEDED
OUTPATIENT
Start: 2025-08-21 | End: 2037-01-17

## 2025-08-21 RX ORDER — DIPHENHYDRAMINE HYDROCHLORIDE 50 MG/ML
25 INJECTION, SOLUTION INTRAMUSCULAR; INTRAVENOUS ONCE AS NEEDED
OUTPATIENT
Start: 2025-08-21 | End: 2037-01-17

## 2025-08-21 RX ADMIN — SODIUM CHLORIDE: 9 INJECTION, SOLUTION INTRAVENOUS at 11:08

## 2025-08-21 RX ADMIN — SODIUM CHLORIDE 300 MG: 9 INJECTION, SOLUTION INTRAVENOUS at 11:08
